# Patient Record
Sex: MALE | Race: WHITE | NOT HISPANIC OR LATINO | Employment: FULL TIME | URBAN - METROPOLITAN AREA
[De-identification: names, ages, dates, MRNs, and addresses within clinical notes are randomized per-mention and may not be internally consistent; named-entity substitution may affect disease eponyms.]

---

## 2022-07-06 ENCOUNTER — PREP FOR SURGERY (OUTPATIENT)
Dept: OTHER | Facility: HOSPITAL | Age: 47
End: 2022-07-06

## 2022-07-06 DIAGNOSIS — Z12.11 SCREEN FOR COLON CANCER: Primary | ICD-10-CM

## 2022-08-24 PROBLEM — Z12.11 SCREEN FOR COLON CANCER: Status: ACTIVE | Noted: 2022-08-24

## 2022-10-19 RX ORDER — OMEPRAZOLE 20 MG/1
20 CAPSULE, DELAYED RELEASE ORAL DAILY
COMMUNITY

## 2022-10-20 ENCOUNTER — ANESTHESIA (OUTPATIENT)
Dept: GASTROENTEROLOGY | Facility: HOSPITAL | Age: 47
End: 2022-10-20

## 2022-10-20 ENCOUNTER — ANESTHESIA EVENT (OUTPATIENT)
Dept: GASTROENTEROLOGY | Facility: HOSPITAL | Age: 47
End: 2022-10-20

## 2022-10-20 ENCOUNTER — HOSPITAL ENCOUNTER (OUTPATIENT)
Facility: HOSPITAL | Age: 47
Setting detail: HOSPITAL OUTPATIENT SURGERY
Discharge: HOME | End: 2022-10-20
Attending: SURGERY | Admitting: SURGERY

## 2022-10-20 VITALS
BODY MASS INDEX: 31.15 KG/M2 | HEART RATE: 63 BPM | HEIGHT: 72 IN | RESPIRATION RATE: 16 BRPM | DIASTOLIC BLOOD PRESSURE: 88 MMHG | WEIGHT: 230 LBS | OXYGEN SATURATION: 100 % | SYSTOLIC BLOOD PRESSURE: 144 MMHG

## 2022-10-20 DIAGNOSIS — Z12.11 SCREEN FOR COLON CANCER: ICD-10-CM

## 2022-10-20 PROCEDURE — 25010000002 PROPOFOL 10 MG/ML EMULSION: Performed by: ANESTHESIOLOGY

## 2022-10-20 PROCEDURE — 88305 TISSUE EXAM BY PATHOLOGIST: CPT | Performed by: SURGERY

## 2022-10-20 PROCEDURE — 45380 COLONOSCOPY AND BIOPSY: CPT | Performed by: SURGERY

## 2022-10-20 PROCEDURE — S0260 H&P FOR SURGERY: HCPCS | Performed by: SURGERY

## 2022-10-20 PROCEDURE — 45385 COLONOSCOPY W/LESION REMOVAL: CPT | Performed by: SURGERY

## 2022-10-20 RX ORDER — LIDOCAINE HYDROCHLORIDE 20 MG/ML
INJECTION, SOLUTION INFILTRATION; PERINEURAL AS NEEDED
Status: DISCONTINUED | OUTPATIENT
Start: 2022-10-20 | End: 2022-10-20 | Stop reason: SURG

## 2022-10-20 RX ORDER — SODIUM CHLORIDE 0.9 % (FLUSH) 0.9 %
10 SYRINGE (ML) INJECTION AS NEEDED
Status: DISCONTINUED | OUTPATIENT
Start: 2022-10-20 | End: 2022-10-20 | Stop reason: HOSPADM

## 2022-10-20 RX ORDER — LIDOCAINE HYDROCHLORIDE 10 MG/ML
0.5 INJECTION, SOLUTION INFILTRATION; PERINEURAL ONCE AS NEEDED
Status: DISCONTINUED | OUTPATIENT
Start: 2022-10-20 | End: 2022-10-20 | Stop reason: HOSPADM

## 2022-10-20 RX ORDER — PROPOFOL 10 MG/ML
VIAL (ML) INTRAVENOUS CONTINUOUS PRN
Status: DISCONTINUED | OUTPATIENT
Start: 2022-10-20 | End: 2022-10-20 | Stop reason: SURG

## 2022-10-20 RX ORDER — SODIUM CHLORIDE, SODIUM LACTATE, POTASSIUM CHLORIDE, CALCIUM CHLORIDE 600; 310; 30; 20 MG/100ML; MG/100ML; MG/100ML; MG/100ML
1000 INJECTION, SOLUTION INTRAVENOUS CONTINUOUS
Status: DISCONTINUED | OUTPATIENT
Start: 2022-10-20 | End: 2022-10-20 | Stop reason: HOSPADM

## 2022-10-20 RX ORDER — PROPOFOL 10 MG/ML
VIAL (ML) INTRAVENOUS AS NEEDED
Status: DISCONTINUED | OUTPATIENT
Start: 2022-10-20 | End: 2022-10-20 | Stop reason: SURG

## 2022-10-20 RX ADMIN — LIDOCAINE HYDROCHLORIDE 40 MG: 20 INJECTION, SOLUTION INFILTRATION; PERINEURAL at 13:03

## 2022-10-20 RX ADMIN — PROPOFOL 120 MG: 10 INJECTION, EMULSION INTRAVENOUS at 13:03

## 2022-10-20 RX ADMIN — SODIUM CHLORIDE, POTASSIUM CHLORIDE, SODIUM LACTATE AND CALCIUM CHLORIDE 1000 ML: 600; 310; 30; 20 INJECTION, SOLUTION INTRAVENOUS at 12:41

## 2022-10-20 RX ADMIN — Medication 140 MCG/KG/MIN: at 13:06

## 2022-10-20 RX ADMIN — PROPOFOL 20 MG: 10 INJECTION, EMULSION INTRAVENOUS at 13:04

## 2022-10-20 NOTE — OP NOTE
PREOPERATIVE DIAGNOSIS:  • Screening    POSTOPERATIVE DIAGNOSIS AND FINDINGS:  • Tiny transverse colon polyp  • Small descending colon polyp    PROCEDURE:  Colonoscopy to terminal ileum with cold biopsy removal of transverse colon polyp and snare removal of descending colon polyp    SURGEON:  Phillip Walker MD    ANESTHESIA:  MAC    SPECIMEN(S):  • Polyps    DESCRIPTION:  In decubitus position digital rectal exam was normal. Colonoscope inserted under direct visualization of lumen to cecum confirmed by visualization of ileocecal valve and appendiceal orifice.  Ileocecal valve was intubated and terminal ileum was grossly normal.  Scope was slowly withdrawn circumferentially examining all mucosal surfaces.  Bowel preparation was good.  There was a tiny polyp in the transverse colon removed completely with cold biopsy forceps, good hemostasis at the site.  A small descending colon polyp was then identified and removed with a cold snare and retrieved, good hemostasis at the site.  No other mucosal abnormalities were noted.  Tolerated well.    RECOMMENDATION FOR FUTURE SURVEILLANCE:  To be determined based on polyp pathology and issued as separate report    Phillip Walker M.D.

## 2022-10-20 NOTE — DISCHARGE INSTRUCTIONS
For the next 24 hours patient needs to be with a responsible adult.    For 24 hours DO NOT drive, operate machinery, appliances, drink alcohol, make important decisions or sign legal documents.    Start with a light or bland diet if you are feeling sick to your stomach otherwise advance to regular diet as tolerated.    Follow recommendations on procedure report if provided by your doctor.    Call Dr Walker for problems [phone]    Problems may include but not limited to: large amounts of bleeding, trouble breathing, repeated vomiting, severe unrelieved pain, fever or chills.

## 2022-10-20 NOTE — ANESTHESIA PREPROCEDURE EVALUATION
Anesthesia Evaluation     no history of anesthetic complications:  NPO Solid Status: > 8 hours  NPO Liquid Status: > 2 hours           Airway   Mallampati: II  Dental      Pulmonary - normal exam   (+) sleep apnea,   Cardiovascular - normal exam        Neuro/Psych  GI/Hepatic/Renal/Endo    (+) obesity,  GERD,      Musculoskeletal     Abdominal    Substance History      OB/GYN          Other                      Anesthesia Plan    ASA 2     MAC             CODE STATUS:

## 2022-10-20 NOTE — H&P
CC: Screening    HPI: 47-year-old gentleman presents for initial screening colonoscopy, no family history of colon cancer    PMH, PSH, MEDS AND ALLERGIES reviewed and reconciled in  EPIC    PHYSICAL EXAM:  • Constitutional:  awake, alert, no acute distress  • VS: afebrile, VSS  • Respiratory:  normal inspiratory effort  • Cardiovascular: regular rate  • Gastrointestinal: Soft    ROS:  relevant systems negative other than any presenting complaints    ASSESSMENT/PLAN:    47-year-old gentleman presents for initial screening colonoscopy    Phillip Walker M.D.

## 2022-10-20 NOTE — ANESTHESIA POSTPROCEDURE EVALUATION
"Patient: Marcos Odell II    Procedure Summary     Date: 10/20/22 [facility] / [facility]    Anesthesia Start: 1302 Anesthesia Stop: 1321    Procedure: COLONOSCOPY TO CECUM WITH COLD POLYPECTOMIES Diagnosis:       Screen for colon cancer      (Screen for colon cancer [Z12.11])    Surgeons: Phillip Walker MD Provider: Florencio Glass MD    Anesthesia Type: MAC ASA Status: 2          Anesthesia Type: MAC    Vitals  Vitals Value Taken Time   /86 10/20/22 1329   Temp     Pulse 67 10/20/22 1329   Resp 16 10/20/22 1329   SpO2 98 % 10/20/22 1329           Post Anesthesia Care and Evaluation    Level of consciousness: awake and alert  Pain management: adequate  Anesthetic complications: No anesthetic complications    Cardiovascular status: acceptable  Respiratory status: acceptable  Hydration status: acceptable    Comments: /86 (BP Location: Left arm, Patient Position: Lying)   Pulse 67   Resp 16   Ht 182.9 cm (72\")   Wt 104 kg (230 lb)   SpO2 98%   BMI 31.19 kg/m²         "

## 2022-10-21 ENCOUNTER — DOCUMENTATION (OUTPATIENT)
Dept: SURGERY | Facility: CLINIC | Age: 47
End: 2022-10-21

## 2022-10-21 LAB
LAB AP CASE REPORT: NORMAL
PATH REPORT.FINAL DX SPEC: NORMAL
PATH REPORT.GROSS SPEC: NORMAL

## 2022-10-21 NOTE — PROGRESS NOTES
ENDOSCOPY FOLLOW UP NOTE    Colonoscopy 10/20/2022    Indication:  • Screening    Findings:  • Tiny transverse colon polyp: Pathology-tubular adenoma  • Small descending colon polyp: Pathology-tubular adenoma    Recommendations:  • 5-year surveillance    Phillip Walker M.D.

## 2022-10-24 ENCOUNTER — TELEPHONE (OUTPATIENT)
Dept: SURGERY | Facility: CLINIC | Age: 47
End: 2022-10-24

## 2022-10-24 NOTE — TELEPHONE ENCOUNTER
----- Message from Phillip Walker MD sent at 10/21/2022 12:43 PM EDT -----  Please let him know that he had 2 benign polyps and 5-year surveillance recommended-put in computer for reminder

## 2022-11-23 ENCOUNTER — HOSPITAL ENCOUNTER (INPATIENT)
Facility: HOSPITAL | Age: 47
LOS: 2 days | Discharge: HOME | End: 2022-11-25
Attending: EMERGENCY MEDICINE | Admitting: INTERNAL MEDICINE

## 2022-11-23 DIAGNOSIS — Z12.11 SCREEN FOR COLON CANCER: ICD-10-CM

## 2022-11-23 DIAGNOSIS — I21.19 ACUTE ST ELEVATION MYOCARDIAL INFARCTION (STEMI) OF INFERIOR WALL: Primary | ICD-10-CM

## 2022-11-23 LAB
ALBUMIN SERPL-MCNC: 4.2 G/DL (ref 3.5–5.2)
ALBUMIN/GLOB SERPL: 1.6 G/DL
ALP SERPL-CCNC: 78 U/L (ref 39–117)
ALT SERPL W P-5'-P-CCNC: 23 U/L (ref 1–41)
ANION GAP SERPL CALCULATED.3IONS-SCNC: 18.4 MMOL/L (ref 5–15)
AST SERPL-CCNC: 19 U/L (ref 1–40)
BASOPHILS # BLD AUTO: 0.1 10*3/MM3 (ref 0–0.2)
BASOPHILS NFR BLD AUTO: 0.4 % (ref 0–1.5)
BILIRUB SERPL-MCNC: <0.2 MG/DL (ref 0–1.2)
BUN SERPL-MCNC: 5 MG/DL (ref 6–20)
BUN/CREAT SERPL: 6.3 (ref 7–25)
CALCIUM SPEC-SCNC: 8.8 MG/DL (ref 8.6–10.5)
CHLORIDE SERPL-SCNC: 102 MMOL/L (ref 98–107)
CO2 SERPL-SCNC: 18.6 MMOL/L (ref 22–29)
CREAT SERPL-MCNC: 0.8 MG/DL (ref 0.76–1.27)
DEPRECATED RDW RBC AUTO: 41 FL (ref 37–54)
EGFRCR SERPLBLD CKD-EPI 2021: 110.5 ML/MIN/1.73
EOSINOPHIL # BLD AUTO: 0.28 10*3/MM3 (ref 0–0.4)
EOSINOPHIL NFR BLD AUTO: 1.2 % (ref 0.3–6.2)
ERYTHROCYTE [DISTWIDTH] IN BLOOD BY AUTOMATED COUNT: 12.6 % (ref 12.3–15.4)
GLOBULIN UR ELPH-MCNC: 2.7 GM/DL
GLUCOSE BLDC GLUCOMTR-MCNC: 238 MG/DL (ref 70–130)
GLUCOSE SERPL-MCNC: 260 MG/DL (ref 65–99)
HCT VFR BLD AUTO: 45.7 % (ref 37.5–51)
HGB BLD-MCNC: 15.6 G/DL (ref 13–17.7)
IMM GRANULOCYTES # BLD AUTO: 0.19 10*3/MM3 (ref 0–0.05)
IMM GRANULOCYTES NFR BLD AUTO: 0.8 % (ref 0–0.5)
LIPASE SERPL-CCNC: 19 U/L (ref 13–60)
LYMPHOCYTES # BLD AUTO: 4.53 10*3/MM3 (ref 0.7–3.1)
LYMPHOCYTES NFR BLD AUTO: 20.2 % (ref 19.6–45.3)
MCH RBC QN AUTO: 30.4 PG (ref 26.6–33)
MCHC RBC AUTO-ENTMCNC: 34.1 G/DL (ref 31.5–35.7)
MCV RBC AUTO: 88.9 FL (ref 79–97)
MONOCYTES # BLD AUTO: 1.68 10*3/MM3 (ref 0.1–0.9)
MONOCYTES NFR BLD AUTO: 7.5 % (ref 5–12)
NEUTROPHILS NFR BLD AUTO: 15.65 10*3/MM3 (ref 1.7–7)
NEUTROPHILS NFR BLD AUTO: 69.9 % (ref 42.7–76)
NRBC BLD AUTO-RTO: 0 /100 WBC (ref 0–0.2)
NT-PROBNP SERPL-MCNC: 12.2 PG/ML (ref 0–450)
PLATELET # BLD AUTO: 377 10*3/MM3 (ref 140–450)
PMV BLD AUTO: 9.5 FL (ref 6–12)
POTASSIUM SERPL-SCNC: 2.8 MMOL/L (ref 3.5–5.2)
PROT SERPL-MCNC: 6.9 G/DL (ref 6–8.5)
RBC # BLD AUTO: 5.14 10*6/MM3 (ref 4.14–5.8)
SODIUM SERPL-SCNC: 139 MMOL/L (ref 136–145)
TROPONIN T SERPL-MCNC: 2.81 NG/ML (ref 0–0.03)
TROPONIN T SERPL-MCNC: <0.01 NG/ML (ref 0–0.03)
WBC NRBC COR # BLD: 22.43 10*3/MM3 (ref 3.4–10.8)

## 2022-11-23 PROCEDURE — C1887 CATHETER, GUIDING: HCPCS | Performed by: INTERNAL MEDICINE

## 2022-11-23 PROCEDURE — C1769 GUIDE WIRE: HCPCS | Performed by: INTERNAL MEDICINE

## 2022-11-23 PROCEDURE — C1874 STENT, COATED/COV W/DEL SYS: HCPCS | Performed by: INTERNAL MEDICINE

## 2022-11-23 PROCEDURE — 25010000002 MIDAZOLAM PER 1 MG: Performed by: INTERNAL MEDICINE

## 2022-11-23 PROCEDURE — 85025 COMPLETE CBC W/AUTO DIFF WBC: CPT | Performed by: EMERGENCY MEDICINE

## 2022-11-23 PROCEDURE — 80053 COMPREHEN METABOLIC PANEL: CPT | Performed by: EMERGENCY MEDICINE

## 2022-11-23 PROCEDURE — 0 IOPAMIDOL PER 1 ML: Performed by: INTERNAL MEDICINE

## 2022-11-23 PROCEDURE — 93005 ELECTROCARDIOGRAM TRACING: CPT | Performed by: EMERGENCY MEDICINE

## 2022-11-23 PROCEDURE — 93458 L HRT ARTERY/VENTRICLE ANGIO: CPT | Performed by: INTERNAL MEDICINE

## 2022-11-23 PROCEDURE — 99285 EMERGENCY DEPT VISIT HI MDM: CPT

## 2022-11-23 PROCEDURE — 4A023N7 MEASUREMENT OF CARDIAC SAMPLING AND PRESSURE, LEFT HEART, PERCUTANEOUS APPROACH: ICD-10-PCS | Performed by: INTERNAL MEDICINE

## 2022-11-23 PROCEDURE — 92941 PRQ TRLML REVSC TOT OCCL AMI: CPT | Performed by: INTERNAL MEDICINE

## 2022-11-23 PROCEDURE — 84484 ASSAY OF TROPONIN QUANT: CPT | Performed by: EMERGENCY MEDICINE

## 2022-11-23 PROCEDURE — 25010000002 HEPARIN (PORCINE) PER 1000 UNITS: Performed by: INTERNAL MEDICINE

## 2022-11-23 PROCEDURE — 85347 COAGULATION TIME ACTIVATED: CPT

## 2022-11-23 PROCEDURE — 83690 ASSAY OF LIPASE: CPT | Performed by: EMERGENCY MEDICINE

## 2022-11-23 PROCEDURE — 99223 1ST HOSP IP/OBS HIGH 75: CPT | Performed by: INTERNAL MEDICINE

## 2022-11-23 PROCEDURE — C1725 CATH, TRANSLUMIN NON-LASER: HCPCS | Performed by: INTERNAL MEDICINE

## 2022-11-23 PROCEDURE — 83880 ASSAY OF NATRIURETIC PEPTIDE: CPT | Performed by: EMERGENCY MEDICINE

## 2022-11-23 PROCEDURE — B2111ZZ FLUOROSCOPY OF MULTIPLE CORONARY ARTERIES USING LOW OSMOLAR CONTRAST: ICD-10-PCS | Performed by: INTERNAL MEDICINE

## 2022-11-23 PROCEDURE — C1894 INTRO/SHEATH, NON-LASER: HCPCS | Performed by: INTERNAL MEDICINE

## 2022-11-23 PROCEDURE — 93005 ELECTROCARDIOGRAM TRACING: CPT | Performed by: INTERNAL MEDICINE

## 2022-11-23 PROCEDURE — B2151ZZ FLUOROSCOPY OF LEFT HEART USING LOW OSMOLAR CONTRAST: ICD-10-PCS | Performed by: INTERNAL MEDICINE

## 2022-11-23 PROCEDURE — 027034Z DILATION OF CORONARY ARTERY, ONE ARTERY WITH DRUG-ELUTING INTRALUMINAL DEVICE, PERCUTANEOUS APPROACH: ICD-10-PCS | Performed by: INTERNAL MEDICINE

## 2022-11-23 PROCEDURE — C9606 PERC D-E COR REVASC W AMI S: HCPCS | Performed by: INTERNAL MEDICINE

## 2022-11-23 PROCEDURE — 82962 GLUCOSE BLOOD TEST: CPT

## 2022-11-23 PROCEDURE — 99152 MOD SED SAME PHYS/QHP 5/>YRS: CPT | Performed by: INTERNAL MEDICINE

## 2022-11-23 DEVICE — XIENCE SKYPOINT™ EVEROLIMUS ELUTING CORONARY STENT SYSTEM 3.50 MM X 23 MM / RAPID-EXCHANGE
Type: IMPLANTABLE DEVICE | Site: CORONARY | Status: FUNCTIONAL
Brand: XIENCE SKYPOINT™

## 2022-11-23 RX ORDER — HEPARIN SODIUM 1000 [USP'U]/ML
INJECTION, SOLUTION INTRAVENOUS; SUBCUTANEOUS
Status: DISCONTINUED | OUTPATIENT
Start: 2022-11-23 | End: 2022-11-23 | Stop reason: HOSPADM

## 2022-11-23 RX ORDER — ACETAMINOPHEN 325 MG/1
650 TABLET ORAL EVERY 4 HOURS PRN
Status: DISCONTINUED | OUTPATIENT
Start: 2022-11-23 | End: 2022-11-25 | Stop reason: HOSPADM

## 2022-11-23 RX ORDER — ATORVASTATIN CALCIUM 20 MG/1
40 TABLET, FILM COATED ORAL NIGHTLY
Status: DISCONTINUED | OUTPATIENT
Start: 2022-11-23 | End: 2022-11-25 | Stop reason: HOSPADM

## 2022-11-23 RX ORDER — VERAPAMIL HYDROCHLORIDE 2.5 MG/ML
INJECTION, SOLUTION INTRAVENOUS
Status: DISCONTINUED | OUTPATIENT
Start: 2022-11-23 | End: 2022-11-23 | Stop reason: HOSPADM

## 2022-11-23 RX ORDER — SODIUM CHLORIDE 0.9 % (FLUSH) 0.9 %
10 SYRINGE (ML) INJECTION AS NEEDED
Status: DISCONTINUED | OUTPATIENT
Start: 2022-11-23 | End: 2022-11-25 | Stop reason: HOSPADM

## 2022-11-23 RX ORDER — METOPROLOL TARTRATE 5 MG/5ML
INJECTION INTRAVENOUS
Status: DISCONTINUED | OUTPATIENT
Start: 2022-11-23 | End: 2022-11-23 | Stop reason: HOSPADM

## 2022-11-23 RX ORDER — NALOXONE HCL 0.4 MG/ML
0.4 VIAL (ML) INJECTION
Status: DISCONTINUED | OUTPATIENT
Start: 2022-11-23 | End: 2022-11-25 | Stop reason: HOSPADM

## 2022-11-23 RX ORDER — PRASUGREL 10 MG/1
TABLET, FILM COATED ORAL
Status: DISCONTINUED | OUTPATIENT
Start: 2022-11-23 | End: 2022-11-23 | Stop reason: HOSPADM

## 2022-11-23 RX ORDER — MORPHINE SULFATE 2 MG/ML
2 INJECTION, SOLUTION INTRAMUSCULAR; INTRAVENOUS
Status: DISCONTINUED | OUTPATIENT
Start: 2022-11-23 | End: 2022-11-25 | Stop reason: HOSPADM

## 2022-11-23 RX ORDER — LIDOCAINE HYDROCHLORIDE 20 MG/ML
INJECTION, SOLUTION INFILTRATION; PERINEURAL
Status: COMPLETED | OUTPATIENT
Start: 2022-11-23 | End: 2022-11-23

## 2022-11-23 RX ORDER — HYDROCODONE BITARTRATE AND ACETAMINOPHEN 5; 325 MG/1; MG/1
1 TABLET ORAL EVERY 4 HOURS PRN
Status: DISCONTINUED | OUTPATIENT
Start: 2022-11-23 | End: 2022-11-25 | Stop reason: HOSPADM

## 2022-11-23 RX ORDER — ONDANSETRON 2 MG/ML
4 INJECTION INTRAMUSCULAR; INTRAVENOUS EVERY 6 HOURS PRN
Status: DISCONTINUED | OUTPATIENT
Start: 2022-11-23 | End: 2022-11-25 | Stop reason: HOSPADM

## 2022-11-23 RX ORDER — ONDANSETRON 4 MG/1
4 TABLET, FILM COATED ORAL EVERY 6 HOURS PRN
Status: DISCONTINUED | OUTPATIENT
Start: 2022-11-23 | End: 2022-11-25 | Stop reason: HOSPADM

## 2022-11-23 RX ORDER — SODIUM CHLORIDE 9 MG/ML
50 INJECTION, SOLUTION INTRAVENOUS CONTINUOUS
Status: ACTIVE | OUTPATIENT
Start: 2022-11-23 | End: 2022-11-24

## 2022-11-23 RX ORDER — MIDAZOLAM HYDROCHLORIDE 1 MG/ML
INJECTION INTRAMUSCULAR; INTRAVENOUS
Status: COMPLETED | OUTPATIENT
Start: 2022-11-23 | End: 2022-11-23

## 2022-11-23 RX ORDER — TEMAZEPAM 15 MG/1
15 CAPSULE ORAL NIGHTLY PRN
Status: DISCONTINUED | OUTPATIENT
Start: 2022-11-23 | End: 2022-11-25 | Stop reason: HOSPADM

## 2022-11-23 RX ORDER — PRASUGREL 10 MG/1
10 TABLET, FILM COATED ORAL DAILY
Status: DISCONTINUED | OUTPATIENT
Start: 2022-11-24 | End: 2022-11-25 | Stop reason: HOSPADM

## 2022-11-23 RX ORDER — ASPIRIN 81 MG/1
81 TABLET ORAL DAILY
Status: DISCONTINUED | OUTPATIENT
Start: 2022-11-23 | End: 2022-11-25 | Stop reason: HOSPADM

## 2022-11-23 RX ADMIN — SODIUM CHLORIDE 50 ML/HR: 9 INJECTION, SOLUTION INTRAVENOUS at 21:06

## 2022-11-23 RX ADMIN — ATORVASTATIN CALCIUM 40 MG: 20 TABLET, FILM COATED ORAL at 20:58

## 2022-11-23 RX ADMIN — ASPIRIN 81 MG: 81 TABLET, COATED ORAL at 20:58

## 2022-11-23 RX ADMIN — HYDROCODONE BITARTRATE AND ACETAMINOPHEN 1 TABLET: 5; 325 TABLET ORAL at 23:06

## 2022-11-24 ENCOUNTER — APPOINTMENT (OUTPATIENT)
Dept: CARDIOLOGY | Facility: HOSPITAL | Age: 47
End: 2022-11-24

## 2022-11-24 LAB
ANION GAP SERPL CALCULATED.3IONS-SCNC: 9.6 MMOL/L (ref 5–15)
AORTIC DIMENSIONLESS INDEX: 0.9 (DI)
BH CV ECHO MEAS - AO MAX PG: 5 MMHG
BH CV ECHO MEAS - AO MEAN PG: 2.5 MMHG
BH CV ECHO MEAS - AO V2 MAX: 111.5 CM/SEC
BH CV ECHO MEAS - AO V2 VTI: 19.6 CM
BH CV ECHO MEAS - AVA(I,D): 3.3 CM2
BH CV ECHO MEAS - EDV(MOD-SP2): 124 ML
BH CV ECHO MEAS - EDV(MOD-SP4): 109 ML
BH CV ECHO MEAS - EF(MOD-BP): 51.5 %
BH CV ECHO MEAS - EF(MOD-SP2): 55.6 %
BH CV ECHO MEAS - EF(MOD-SP4): 47.7 %
BH CV ECHO MEAS - ESV(MOD-SP2): 55 ML
BH CV ECHO MEAS - ESV(MOD-SP4): 57 ML
BH CV ECHO MEAS - LAT PEAK E' VEL: 11.3 CM/SEC
BH CV ECHO MEAS - LV DIASTOLIC VOL/BSA (35-75): 47.6 CM2
BH CV ECHO MEAS - LV MAX PG: 2.8 MMHG
BH CV ECHO MEAS - LV MEAN PG: 1.16 MMHG
BH CV ECHO MEAS - LV SYSTOLIC VOL/BSA (12-30): 24.9 CM2
BH CV ECHO MEAS - LV V1 MAX: 84.4 CM/SEC
BH CV ECHO MEAS - LV V1 VTI: 17.2 CM
BH CV ECHO MEAS - LVOT AREA: 3.8 CM2
BH CV ECHO MEAS - LVOT DIAM: 2.19 CM
BH CV ECHO MEAS - MED PEAK E' VEL: 6.3 CM/SEC
BH CV ECHO MEAS - MV A DUR: 0.13 SEC
BH CV ECHO MEAS - MV A MAX VEL: 94.3 CM/SEC
BH CV ECHO MEAS - MV DEC SLOPE: 415.2 CM/SEC2
BH CV ECHO MEAS - MV DEC TIME: 200 MSEC
BH CV ECHO MEAS - MV E MAX VEL: 88.3 CM/SEC
BH CV ECHO MEAS - MV E/A: 0.94
BH CV ECHO MEAS - MV MAX PG: 3.1 MMHG
BH CV ECHO MEAS - MV MEAN PG: 1.46 MMHG
BH CV ECHO MEAS - MV P1/2T: 60.3 MSEC
BH CV ECHO MEAS - MV V2 VTI: 25.3 CM
BH CV ECHO MEAS - MVA(P1/2T): 3.6 CM2
BH CV ECHO MEAS - MVA(VTI): 2.6 CM2
BH CV ECHO MEAS - PA ACC TIME: 0.1 SEC
BH CV ECHO MEAS - PA PR(ACCEL): 35 MMHG
BH CV ECHO MEAS - PA V2 MAX: 103.2 CM/SEC
BH CV ECHO MEAS - PULM A REVS DUR: 0.12 SEC
BH CV ECHO MEAS - PULM A REVS VEL: 38.1 CM/SEC
BH CV ECHO MEAS - PULM DIAS VEL: 35 CM/SEC
BH CV ECHO MEAS - PULM S/D: 0.9
BH CV ECHO MEAS - PULM SYS VEL: 31.5 CM/SEC
BH CV ECHO MEAS - RAP SYSTOLE: 3 MMHG
BH CV ECHO MEAS - RV MAX PG: 1.61 MMHG
BH CV ECHO MEAS - RV V1 MAX: 63.4 CM/SEC
BH CV ECHO MEAS - RV V1 VTI: 12.9 CM
BH CV ECHO MEAS - SI(MOD-SP2): 30.1 ML/M2
BH CV ECHO MEAS - SI(MOD-SP4): 22.7 ML/M2
BH CV ECHO MEAS - SV(LVOT): 64.7 ML
BH CV ECHO MEAS - SV(MOD-SP2): 69 ML
BH CV ECHO MEAS - SV(MOD-SP4): 52 ML
BH CV ECHO MEAS - TAPSE (>1.6): 1.82 CM
BH CV ECHO MEASUREMENTS AVERAGE E/E' RATIO: 10.03
BH CV XLRA - RV BASE: 3.6 CM
BH CV XLRA - RV LENGTH: 7.7 CM
BH CV XLRA - RV MID: 3.1 CM
BH CV XLRA - TDI S': 10 CM/SEC
BUN SERPL-MCNC: 5 MG/DL (ref 6–20)
BUN/CREAT SERPL: 6.6 (ref 7–25)
CALCIUM SPEC-SCNC: 9.2 MG/DL (ref 8.6–10.5)
CHLORIDE SERPL-SCNC: 104 MMOL/L (ref 98–107)
CHOLEST SERPL-MCNC: 198 MG/DL (ref 0–200)
CO2 SERPL-SCNC: 23.4 MMOL/L (ref 22–29)
CREAT SERPL-MCNC: 0.76 MG/DL (ref 0.76–1.27)
DEPRECATED RDW RBC AUTO: 38.7 FL (ref 37–54)
EGFRCR SERPLBLD CKD-EPI 2021: 112.3 ML/MIN/1.73
ERYTHROCYTE [DISTWIDTH] IN BLOOD BY AUTOMATED COUNT: 12.4 % (ref 12.3–15.4)
GLUCOSE BLDC GLUCOMTR-MCNC: 123 MG/DL (ref 70–130)
GLUCOSE BLDC GLUCOMTR-MCNC: 143 MG/DL (ref 70–130)
GLUCOSE BLDC GLUCOMTR-MCNC: 152 MG/DL (ref 70–130)
GLUCOSE BLDC GLUCOMTR-MCNC: 156 MG/DL (ref 70–130)
GLUCOSE BLDC GLUCOMTR-MCNC: 187 MG/DL (ref 70–130)
GLUCOSE SERPL-MCNC: 174 MG/DL (ref 65–99)
HBA1C MFR BLD: 6.7 % (ref 4.8–5.6)
HCT VFR BLD AUTO: 41.6 % (ref 37.5–51)
HDLC SERPL-MCNC: 32 MG/DL (ref 40–60)
HGB BLD-MCNC: 14.9 G/DL (ref 13–17.7)
LDLC SERPL CALC-MCNC: 105 MG/DL (ref 0–100)
LDLC/HDLC SERPL: 2.94 {RATIO}
LEFT ATRIUM VOLUME INDEX: 17.9 ML/M2
MAXIMAL PREDICTED HEART RATE: 174 BPM
MCH RBC QN AUTO: 30.5 PG (ref 26.6–33)
MCHC RBC AUTO-ENTMCNC: 35.8 G/DL (ref 31.5–35.7)
MCV RBC AUTO: 85.2 FL (ref 79–97)
PLATELET # BLD AUTO: 310 10*3/MM3 (ref 140–450)
PMV BLD AUTO: 9.3 FL (ref 6–12)
POTASSIUM SERPL-SCNC: 4 MMOL/L (ref 3.5–5.2)
QT INTERVAL: 384 MS
QT INTERVAL: 384 MS
QT INTERVAL: 484 MS
RBC # BLD AUTO: 4.88 10*6/MM3 (ref 4.14–5.8)
SINUS: 3.2 CM
SODIUM SERPL-SCNC: 137 MMOL/L (ref 136–145)
STRESS TARGET HR: 148 BPM
TRIGL SERPL-MCNC: 359 MG/DL (ref 0–150)
VLDLC SERPL-MCNC: 61 MG/DL (ref 5–40)
WBC NRBC COR # BLD: 13.93 10*3/MM3 (ref 3.4–10.8)

## 2022-11-24 PROCEDURE — 63710000001 INSULIN LISPRO (HUMAN) PER 5 UNITS: Performed by: INTERNAL MEDICINE

## 2022-11-24 PROCEDURE — 93005 ELECTROCARDIOGRAM TRACING: CPT | Performed by: INTERNAL MEDICINE

## 2022-11-24 PROCEDURE — 80061 LIPID PANEL: CPT | Performed by: INTERNAL MEDICINE

## 2022-11-24 PROCEDURE — 80048 BASIC METABOLIC PNL TOTAL CA: CPT | Performed by: INTERNAL MEDICINE

## 2022-11-24 PROCEDURE — 93306 TTE W/DOPPLER COMPLETE: CPT

## 2022-11-24 PROCEDURE — 83036 HEMOGLOBIN GLYCOSYLATED A1C: CPT | Performed by: INTERNAL MEDICINE

## 2022-11-24 PROCEDURE — 93356 MYOCRD STRAIN IMG SPCKL TRCK: CPT

## 2022-11-24 PROCEDURE — 82962 GLUCOSE BLOOD TEST: CPT

## 2022-11-24 PROCEDURE — 93306 TTE W/DOPPLER COMPLETE: CPT | Performed by: INTERNAL MEDICINE

## 2022-11-24 PROCEDURE — 99232 SBSQ HOSP IP/OBS MODERATE 35: CPT | Performed by: INTERNAL MEDICINE

## 2022-11-24 PROCEDURE — 85027 COMPLETE CBC AUTOMATED: CPT | Performed by: INTERNAL MEDICINE

## 2022-11-24 RX ORDER — LOSARTAN POTASSIUM 25 MG/1
25 TABLET ORAL
Status: DISCONTINUED | OUTPATIENT
Start: 2022-11-24 | End: 2022-11-25 | Stop reason: HOSPADM

## 2022-11-24 RX ORDER — DEXTROSE MONOHYDRATE 25 G/50ML
25 INJECTION, SOLUTION INTRAVENOUS
Status: DISCONTINUED | OUTPATIENT
Start: 2022-11-24 | End: 2022-11-25 | Stop reason: HOSPADM

## 2022-11-24 RX ORDER — INSULIN LISPRO 100 [IU]/ML
0-14 INJECTION, SOLUTION INTRAVENOUS; SUBCUTANEOUS
Status: DISCONTINUED | OUTPATIENT
Start: 2022-11-24 | End: 2022-11-25 | Stop reason: HOSPADM

## 2022-11-24 RX ORDER — NICOTINE POLACRILEX 4 MG
15 LOZENGE BUCCAL
Status: DISCONTINUED | OUTPATIENT
Start: 2022-11-24 | End: 2022-11-25 | Stop reason: HOSPADM

## 2022-11-24 RX ADMIN — HYDROCODONE BITARTRATE AND ACETAMINOPHEN 1 TABLET: 5; 325 TABLET ORAL at 13:15

## 2022-11-24 RX ADMIN — HYDROCODONE BITARTRATE AND ACETAMINOPHEN 1 TABLET: 5; 325 TABLET ORAL at 17:49

## 2022-11-24 RX ADMIN — INSULIN LISPRO 2 UNITS: 100 INJECTION, SOLUTION INTRAVENOUS; SUBCUTANEOUS at 16:57

## 2022-11-24 RX ADMIN — HYDROCODONE BITARTRATE AND ACETAMINOPHEN 1 TABLET: 5; 325 TABLET ORAL at 03:15

## 2022-11-24 RX ADMIN — ASPIRIN 81 MG: 81 TABLET, COATED ORAL at 08:40

## 2022-11-24 RX ADMIN — METOPROLOL TARTRATE 25 MG: 25 TABLET, FILM COATED ORAL at 08:40

## 2022-11-24 RX ADMIN — ATORVASTATIN CALCIUM 40 MG: 20 TABLET, FILM COATED ORAL at 20:01

## 2022-11-24 RX ADMIN — HYDROCODONE BITARTRATE AND ACETAMINOPHEN 1 TABLET: 5; 325 TABLET ORAL at 21:55

## 2022-11-24 RX ADMIN — LOSARTAN POTASSIUM 25 MG: 25 TABLET, FILM COATED ORAL at 15:51

## 2022-11-24 RX ADMIN — HYDROCODONE BITARTRATE AND ACETAMINOPHEN 1 TABLET: 5; 325 TABLET ORAL at 08:47

## 2022-11-24 RX ADMIN — PRASUGREL 10 MG: 10 TABLET, FILM COATED ORAL at 08:40

## 2022-11-24 RX ADMIN — METOPROLOL TARTRATE 25 MG: 25 TABLET, FILM COATED ORAL at 20:01

## 2022-11-25 ENCOUNTER — READMISSION MANAGEMENT (OUTPATIENT)
Dept: CALL CENTER | Facility: HOSPITAL | Age: 47
End: 2022-11-25

## 2022-11-25 VITALS
RESPIRATION RATE: 18 BRPM | OXYGEN SATURATION: 98 % | HEIGHT: 72 IN | DIASTOLIC BLOOD PRESSURE: 75 MMHG | BODY MASS INDEX: 32.1 KG/M2 | HEART RATE: 81 BPM | WEIGHT: 237 LBS | TEMPERATURE: 98.2 F | SYSTOLIC BLOOD PRESSURE: 106 MMHG

## 2022-11-25 LAB
ACT BLD: 295 SECONDS (ref 82–152)
GLUCOSE BLDC GLUCOMTR-MCNC: 152 MG/DL (ref 70–130)

## 2022-11-25 PROCEDURE — 82962 GLUCOSE BLOOD TEST: CPT

## 2022-11-25 PROCEDURE — 63710000001 INSULIN LISPRO (HUMAN) PER 5 UNITS: Performed by: INTERNAL MEDICINE

## 2022-11-25 PROCEDURE — 99238 HOSP IP/OBS DSCHRG MGMT 30/<: CPT | Performed by: NURSE PRACTITIONER

## 2022-11-25 RX ORDER — ATORVASTATIN CALCIUM 40 MG/1
40 TABLET, FILM COATED ORAL NIGHTLY
Qty: 30 TABLET | Refills: 5 | Status: SHIPPED | OUTPATIENT
Start: 2022-11-25

## 2022-11-25 RX ORDER — ASPIRIN 81 MG/1
81 TABLET ORAL DAILY
Qty: 30 TABLET | Refills: 11 | Status: SHIPPED | OUTPATIENT
Start: 2022-11-26

## 2022-11-25 RX ORDER — LOSARTAN POTASSIUM 25 MG/1
25 TABLET ORAL
Qty: 30 TABLET | Refills: 11 | Status: SHIPPED | OUTPATIENT
Start: 2022-11-26

## 2022-11-25 RX ORDER — METFORMIN HYDROCHLORIDE 500 MG/1
500 TABLET, EXTENDED RELEASE ORAL
Qty: 30 TABLET | Refills: 1 | Status: SHIPPED | OUTPATIENT
Start: 2022-11-25

## 2022-11-25 RX ORDER — PRASUGREL 10 MG/1
10 TABLET, FILM COATED ORAL DAILY
Qty: 30 TABLET | Refills: 11 | Status: SHIPPED | OUTPATIENT
Start: 2022-11-26

## 2022-11-25 RX ADMIN — INSULIN LISPRO 3 UNITS: 100 INJECTION, SOLUTION INTRAVENOUS; SUBCUTANEOUS at 07:04

## 2022-11-25 RX ADMIN — ASPIRIN 81 MG: 81 TABLET, COATED ORAL at 08:27

## 2022-11-25 RX ADMIN — METOPROLOL TARTRATE 25 MG: 25 TABLET, FILM COATED ORAL at 08:27

## 2022-11-25 RX ADMIN — LOSARTAN POTASSIUM 25 MG: 25 TABLET, FILM COATED ORAL at 08:27

## 2022-11-25 RX ADMIN — PRASUGREL 10 MG: 10 TABLET, FILM COATED ORAL at 08:27

## 2022-11-26 NOTE — OUTREACH NOTE
Prep Survey    Flowsheet Row Responses   [facility] patient discharged from? [place]   Is LACE score < 7 ? Yes   Emergency Room discharge w/ pulse ox? No   Eligibility Readm Mgmt   Discharge diagnosis   Acute ST elevation myocardial infarction   Does the patient have one of the following disease processes/diagnoses(primary or secondary)? Acute MI (STEMI,NSTEMI)   Does the patient have Home health ordered? No   Is there a DME ordered? No   Prep survey completed? Yes          PHU STINSON - Registered Nurse

## 2022-11-30 ENCOUNTER — TELEPHONE (OUTPATIENT)
Dept: CARDIAC REHAB | Facility: HOSPITAL | Age: 47
End: 2022-11-30

## 2022-11-30 NOTE — TELEPHONE ENCOUNTER
Called patient following discharge form hospital to discuss cardiac rehab program. Patient stated would think about attending, was going to meet with APRN next Tuesday and would discuss this with her and call us back in interested in attending. Provided our contact information.

## 2022-12-01 ENCOUNTER — READMISSION MANAGEMENT (OUTPATIENT)
Dept: CALL CENTER | Facility: HOSPITAL | Age: 47
End: 2022-12-01

## 2022-12-01 NOTE — OUTREACH NOTE
AMI Week 1 Survey    Flowsheet Row Responses   [facility] facility patient discharged from? [place]   Does the patient have one of the following disease processes/diagnoses(primary or secondary)? Acute MI (STEMI,NSTEMI)   Week 1 attempt successful? No   Unsuccessful attempts Attempt 1          FANTA MEDRANO - Registered Nurse

## 2022-12-05 ENCOUNTER — READMISSION MANAGEMENT (OUTPATIENT)
Dept: CALL CENTER | Facility: HOSPITAL | Age: 47
End: 2022-12-05

## 2022-12-05 NOTE — OUTREACH NOTE
AMI Week 1 Survey    Flowsheet Row Responses   [facility] patient discharged from? [place]   Does the patient have one of the following disease processes/diagnoses(primary or secondary)? Acute MI (STEMI,NSTEMI)   Week 1 attempt successful? Yes   Call start time 1652   Call end time 1655   Discharge diagnosis   Acute ST elevation myocardial infarction   Person spoke with today (if not patient) and relationship Gauri- wife   Meds reviewed with patient/caregiver? Yes   Is the patient having any side effects they believe may be caused by any medication additions or changes? No   Does the patient have all prescriptions related to this admission filled (includes statins,anticoagulants,HTN meds,anti-arrhythmia meds) Yes   Is the patient taking all medications as directed (includes completed medication regime)? Yes   Does the patient have a primary care provider?  Yes   Does the patient have an appointment with their PCP,cardiologist,or clinic within 7 days of discharge? Yes   Has the patient kept scheduled appointments due by today? N/A   Psychosocial issues? No   Comments Per wife pt has no issues, denies chest pain, SOA/nausea/diaphoresis. LHC site- right radial healed.   Did the patient receive a copy of their discharge instructions? Yes   Nursing interventions Reviewed instructions with patient   What is the patient's perception of their health status since discharge? Returned to baseline/stable   Nursing interventions Nurse provided patient education   Is the patient/caregiver able to teach back signs and symptoms of when to call for help immediately: Sudden chest discomfort, Sudden discomfort in arms, back, neck or jaw, Dizziness or lightheadedness   Nursing interventions Nurse provided patient education   Is the patient/caregiver able to teach back lifestyle changes to help prevent MIs Regular exercise as approved by provider   Is the patient/caregiver able to teach back ways to prevent a second heart attack:  Follow up with MD   Is the patient/caregiver able to teach back the hierarchy of who to call/visit for symptoms/problems? PCP, Specialist, Home health nurse, Urgent Care, ED, 911 Yes   Additional teach back comments educated on importance of home BP monitoring daily   Week 1 call completed? Yes   Revoked No further contact(revokes)-requires comment   Is the patient interested in additional calls from an ambulatory ?  NOTE:  applies to high risk patients requiring additional follow-up. No   Graduated/Revoked comments santiago VILLALTA - Registered Nurse

## 2022-12-06 ENCOUNTER — OFFICE VISIT (OUTPATIENT)
Dept: CARDIOLOGY | Facility: CLINIC | Age: 47
End: 2022-12-06

## 2022-12-06 VITALS
SYSTOLIC BLOOD PRESSURE: 118 MMHG | BODY MASS INDEX: 31.97 KG/M2 | DIASTOLIC BLOOD PRESSURE: 62 MMHG | WEIGHT: 236 LBS | HEIGHT: 72 IN | HEART RATE: 77 BPM

## 2022-12-06 DIAGNOSIS — Z72.0 TOBACCO USE: ICD-10-CM

## 2022-12-06 DIAGNOSIS — Z95.5 S/P PRIMARY ANGIOPLASTY WITH CORONARY STENT: ICD-10-CM

## 2022-12-06 DIAGNOSIS — E78.5 HYPERLIPIDEMIA LDL GOAL <70: ICD-10-CM

## 2022-12-06 DIAGNOSIS — E11.9 TYPE 2 DIABETES MELLITUS WITHOUT COMPLICATION, WITHOUT LONG-TERM CURRENT USE OF INSULIN: ICD-10-CM

## 2022-12-06 DIAGNOSIS — I25.2 HISTORY OF ACUTE INFERIOR WALL MI: Primary | ICD-10-CM

## 2022-12-06 DIAGNOSIS — I25.10 CORONARY ARTERY DISEASE INVOLVING NATIVE CORONARY ARTERY OF NATIVE HEART WITHOUT ANGINA PECTORIS: ICD-10-CM

## 2022-12-06 PROCEDURE — 99214 OFFICE O/P EST MOD 30 MIN: CPT | Performed by: NURSE PRACTITIONER

## 2022-12-06 PROCEDURE — 93000 ELECTROCARDIOGRAM COMPLETE: CPT | Performed by: NURSE PRACTITIONER

## 2022-12-06 NOTE — PROGRESS NOTES
Date of Office Visit: 2022  Encounter Provider: ARJUN Jacobo  Place of Service: [facility] CARDIOLOGY  Patient Name: Marcos Odell II  :1975    No chief complaint on file.  : one week  Post STEMI    HPI: Marcos Odell II is a 47 y.o. male who is a patient of Dr. Romero.  He is new to me today and presents for hospital follow-up.  Patient presented to [facility] on  with severe central chest discomfort associated with diaphoresis and nausea.  He was noted to have inferior STEMI.  Patient was taken to the Cath Lab and found to have an acutely occluded distal RCA and subsequently underwent PCI/PRIYA to distal RCA.  He had no other obstructive coronary disease.  Creatinine stable post cath.  Patient was started on DAPT with aspirin and Effient.  Follow-up echocardiogram showed normal left ventricular systolic function and normal diastolic function with no significant valvular disease.    Patient presents today with no complaints of chest pain, pressure, shortness of breath, lightheadedness or edema.  His blood pressure is well controlled on office visit today.  He is not checking his blood pressure on a normal basis.  EKG is stable. Right radial site soft.  We had lengthy discussion about risk factor modification.  He is on statin therapy.  Labs are checked by PCP on a normal basis.  Patient notes that prior to coming to hospital he had been drinking about 2 large energy drinks each day.  He has not had any since that time.  Patient does drink 3 cups of black coffee each day.  Patient previously smoked cigarettes, now smokes a couple of cigars.  He does have family history of premature coronary disease with his paternal grandmother.  Patient's paternal uncle who was very healthy also had MI in his early 60's.  Patient's father, mother and siblings do not have any cardiac issues.  Patient has a physical job.  He will speak with [facility] and is not opposed to it; however, timing may be a factor with his job.      Previous testing and notes have been reviewed by me.   Past Medical History:   Diagnosis Date   • GERD (gastroesophageal reflux disease)    • Sleep apnea     has CPAP, does not use       Past Surgical History:   Procedure Laterality Date   • CARDIAC CATHETERIZATION N/A 11/23/2022    Procedure: Left Heart Cath;  Surgeon: Prabhakar Romero MD;  Location: [facility] CATH INVASIVE LOCATION;  Service: Cardiovascular;  Laterality: N/A;   • CARDIAC CATHETERIZATION N/A 11/23/2022    Procedure: Percutaneous Coronary Intervention;  Surgeon: Prabhakar Romero MD;  [facility];  Service: Cardiovascular;  Laterality: N/A;   • CARDIAC CATHETERIZATION N/A 11/23/2022    Procedure: Stent PRIYA coronary;  Surgeon: Prabhakar Romero MD;  [facility];  Service: Cardiovascular;  Laterality: N/A;   • CARDIAC CATHETERIZATION N/A 11/23/2022    Procedure: Coronary angiography;  Surgeon: Prabhakar Romero MD;  Location: [facility] CATH INVASIVE LOCATION;  Service: Cardiovascular;  Laterality: N/A;   • COLONOSCOPY N/A 10/20/2022    Procedure: COLONOSCOPY TO CECUM WITH COLD POLYPECTOMIES;  Surgeon: Phillip Walker MD;  Location: [facility] ENDOSCOPY;  Service: General;  Laterality: N/A;  SCREENING  COLON POLYPS   • FOOT SURGERY Left     remove a foreign object   • TONSILLECTOMY         Social History     Socioeconomic History   • Marital status:    Tobacco Use   • Smoking status: Never   • Smokeless tobacco: Current     Types: Chew   Vaping Use   • Vaping Use: Never used   Substance and Sexual Activity   • Alcohol use: Yes     Comment: occ   • Drug use: Never   • Sexual activity: Defer       Family History   Problem Relation Age of Onset   • Malig Hyperthermia Neg Hx        Review of Systems   Constitutional: Negative.   HENT: Negative.    Eyes: Negative.    Cardiovascular: Negative.   "  Respiratory: Negative.    Endocrine: Negative.    Hematologic/Lymphatic:        DAPT with asa/effient   Skin: Negative.    Musculoskeletal: Negative.    Gastrointestinal: Negative.    Genitourinary: Negative.    Neurological: Negative.    Psychiatric/Behavioral: Negative.    Allergic/Immunologic: Negative.        Allergies   Allergen Reactions   • Penicillins Other (See Comments)     childhood         Current Outpatient Medications:   •  aspirin 81 MG EC tablet, Take 1 tablet by mouth Daily., Disp: 30 tablet, Rfl: 11  •  atorvastatin (LIPITOR) 40 MG tablet, Take 1 tablet by mouth Every Night., Disp: 30 tablet, Rfl: 5  •  losartan (COZAAR) 25 MG tablet, Take 1 tablet by mouth Daily., Disp: 30 tablet, Rfl: 11  •  metFORMIN ER (Glucophage XR) 500 MG 24 hr tablet, Take 1 tablet by mouth Daily With Breakfast., Disp: 30 tablet, Rfl: 1  •  metoprolol tartrate (LOPRESSOR) 25 MG tablet, Take 1 tablet by mouth Every 12 (Twelve) Hours., Disp: 60 tablet, Rfl: 5  •  omeprazole (priLOSEC) 20 MG capsule, Take 1 capsule by mouth Daily., Disp: , Rfl:   •  prasugrel (EFFIENT) 10 MG tablet, Take 1 tablet by mouth Daily., Disp: 30 tablet, Rfl: 11      Objective:     Vitals:    12/06/22 1441   BP: 118/62   Pulse: 77   Weight: 107 kg (236 lb)   Height: 183 cm (72.03\")     Body mass index is 31.98 kg/m².     2D Echocardiogram 11/24/2022:   •  Left ventricular ejection fraction appears to be 51 - 55%.  •  Left ventricular diastolic function was normal.  Normal RV size and systolic function  No significant valvular disease    Left Heart Cath 11/23/2022:  Left main: Large-caliber vessel bifurcates to an LAD and circumflex.  The left main coronary artery is normal  LAD: Medium caliber vessel that gives rise to a small caliber diagonal branch in the midsegment.  The LAD has luminal irregularities in the midsegment.  Left circumflex: Medium caliber vessel gives rise to a medium caliber OM1 and small caliber OM 2.  The first obtuse marginal " branch has a discrete 40% ostial stenosis  RCA: Large-caliber, dominant vessel that gives rise to a medium caliber PDA and 2 small caliber RPL branches.  Acutely occluded in the distal segment.    Left ventricular angiography: Normal left ventricular size and systolic function.  Basal to mid inferior wall dyskinesis.  Ejection fraction 55%    Intervention: PCI with easement of 3.5 x 23 mm Xience Skypoint drug-eluting stent across the distal RCA.  HA III flow with no complications at completion.     PHYSICAL EXAM:    Constitutional:       Appearance: Healthy appearance. Not in distress.   Neck:      Vascular: No JVR. JVD normal.   Pulmonary:      Effort: Pulmonary effort is normal.      Breath sounds: Normal breath sounds. No wheezing. No rhonchi. No rales.   Chest:      Chest wall: Not tender to palpatation.   Cardiovascular:      PMI at left midclavicular line. Normal rate. Regular rhythm. Normal S1. Normal S2.      Murmurs: There is no murmur.      No gallop. No click. No rub.   Pulses:     Intact distal pulses.   Edema:     Peripheral edema absent.   Abdominal:      General: Bowel sounds are normal.      Palpations: Abdomen is soft.      Tenderness: There is no abdominal tenderness.   Musculoskeletal: Normal range of motion.         General: No tenderness. Skin:     General: Skin is warm and dry.   Neurological:      General: No focal deficit present.      Mental Status: Alert and oriented to person, place and time.           ECG 12 Lead    Date/Time: 12/6/2022 3:33 PM  Performed by: Makeda Arana APRN  Authorized by: Makeda Arana APRN   Comparison: compared with previous ECG from 11/24/2022  Rhythm: sinus rhythm  Rate: normal  BPM: 77  T inversion: III and aVF                Assessment:       Diagnosis Plan   1. History of acute inferior wall MI        2. Coronary artery disease involving native coronary artery of native heart without angina pectoris        3. S/P primary angioplasty with coronary  stent        4. Hyperlipidemia LDL goal <70        5. Tobacco use        6. Type 2 diabetes mellitus without complication, without long-term current use of insulin ([facility])          Orders Placed This Encounter   Procedures   • ECG 12 Lead     This order was created via procedure documentation     Order Specific Question:   Release to patient     Answer:   Routine Release          Plan:       1.  History of inferior STEMI: normal LVEF 55%  2.  Coronary artery disease status post PCI/PRIYA to distal RCA: DAPT with aspirin/Effient, beta-blocker and statin.  NO angina. Stable EKG. NO caffeine drinks.   3.  Hyperlipidemia: Lipid panel shows total cholesterol 198, triglycerides 359, HDL 32, .  LDL less than 70.  Placed on statin while in hospital. Dietary modifications.  Lipid panel in approx 6 months.  Labs checked by PCP.   4.  Diabetes type 2: Hemoglobin A1c 6.7.  On oral medication  5.  Tobacco abuse: smoking cessation recommended    Patient will follow up with Dr. Romero in 6-8 weeks.  He will call sooner for any questions or concerns.            Your medication list          Accurate as of December 6, 2022  3:36 PM. If you have any questions, ask your nurse or doctor.            CONTINUE taking these medications      Instructions Last Dose Given Next Dose Due   aspirin 81 MG EC tablet      Take 1 tablet by mouth Daily.       atorvastatin 40 MG tablet  Commonly known as: LIPITOR      Take 1 tablet by mouth Every Night.       losartan 25 MG tablet  Commonly known as: COZAAR      Take 1 tablet by mouth Daily.       metFORMIN  MG 24 hr tablet  Commonly known as: Glucophage XR      Take 1 tablet by mouth Daily With Breakfast.       metoprolol tartrate 25 MG tablet  Commonly known as: LOPRESSOR      Take 1 tablet by mouth Every 12 (Twelve) Hours.       omeprazole 20 MG capsule  Commonly known as: priLOSEC      Take 1 capsule by mouth Daily.       prasugrel 10 MG tablet  Commonly known as: EFFIENT      Take 1  tablet by mouth Daily.                As always, it has been a pleasure to participate in your patient's care.      Sincerely,       ARJUN Raygoza

## 2022-12-27 ENCOUNTER — HOSPITAL ENCOUNTER (OUTPATIENT)
Facility: HOSPITAL | Age: 47
Setting detail: OBSERVATION
Discharge: HOME | End: 2022-12-28
Attending: EMERGENCY MEDICINE | Admitting: EMERGENCY MEDICINE
Payer: COMMERCIAL

## 2022-12-27 ENCOUNTER — APPOINTMENT (OUTPATIENT)
Dept: GENERAL RADIOLOGY | Facility: HOSPITAL | Age: 47
End: 2022-12-27
Payer: COMMERCIAL

## 2022-12-27 DIAGNOSIS — R55 SYNCOPE AND COLLAPSE: Primary | ICD-10-CM

## 2022-12-27 DIAGNOSIS — Z86.79 HISTORY OF CORONARY ARTERY DISEASE: ICD-10-CM

## 2022-12-27 LAB
ALBUMIN SERPL-MCNC: 3.9 G/DL (ref 3.5–5.2)
ALBUMIN/GLOB SERPL: 1.7 G/DL
ALP SERPL-CCNC: 80 U/L (ref 39–117)
ALT SERPL W P-5'-P-CCNC: 27 U/L (ref 1–41)
AMPHET+METHAMPHET UR QL: NEGATIVE
ANION GAP SERPL CALCULATED.3IONS-SCNC: 8.2 MMOL/L (ref 5–15)
AST SERPL-CCNC: 18 U/L (ref 1–40)
BARBITURATES UR QL SCN: NEGATIVE
BASOPHILS # BLD AUTO: 0.04 10*3/MM3 (ref 0–0.2)
BASOPHILS NFR BLD AUTO: 0.4 % (ref 0–1.5)
BENZODIAZ UR QL SCN: NEGATIVE
BILIRUB SERPL-MCNC: 0.6 MG/DL (ref 0–1.2)
BILIRUB UR QL STRIP: NEGATIVE
BUN SERPL-MCNC: 10 MG/DL (ref 6–20)
BUN/CREAT SERPL: 12.3 (ref 7–25)
CALCIUM SPEC-SCNC: 8.7 MG/DL (ref 8.6–10.5)
CANNABINOIDS SERPL QL: NEGATIVE
CHLORIDE SERPL-SCNC: 106 MMOL/L (ref 98–107)
CLARITY UR: CLEAR
CO2 SERPL-SCNC: 22.8 MMOL/L (ref 22–29)
COCAINE UR QL: NEGATIVE
COLOR UR: YELLOW
CREAT SERPL-MCNC: 0.81 MG/DL (ref 0.76–1.27)
D-LACTATE SERPL-SCNC: 1.9 MMOL/L (ref 0.5–2)
DEPRECATED RDW RBC AUTO: 42 FL (ref 37–54)
EGFRCR SERPLBLD CKD-EPI 2021: 110.1 ML/MIN/1.73
EOSINOPHIL # BLD AUTO: 0.12 10*3/MM3 (ref 0–0.4)
EOSINOPHIL NFR BLD AUTO: 1.1 % (ref 0.3–6.2)
ERYTHROCYTE [DISTWIDTH] IN BLOOD BY AUTOMATED COUNT: 12.9 % (ref 12.3–15.4)
ETHANOL BLD-MCNC: <10 MG/DL (ref 0–10)
ETHANOL UR QL: <0.01 %
GLOBULIN UR ELPH-MCNC: 2.3 GM/DL
GLUCOSE SERPL-MCNC: 177 MG/DL (ref 65–99)
GLUCOSE UR STRIP-MCNC: NEGATIVE MG/DL
HCT VFR BLD AUTO: 42.2 % (ref 37.5–51)
HGB BLD-MCNC: 14 G/DL (ref 13–17.7)
HGB UR QL STRIP.AUTO: NEGATIVE
IMM GRANULOCYTES # BLD AUTO: 0.03 10*3/MM3 (ref 0–0.05)
IMM GRANULOCYTES NFR BLD AUTO: 0.3 % (ref 0–0.5)
KETONES UR QL STRIP: NEGATIVE
LEUKOCYTE ESTERASE UR QL STRIP.AUTO: NEGATIVE
LYMPHOCYTES # BLD AUTO: 1.17 10*3/MM3 (ref 0.7–3.1)
LYMPHOCYTES NFR BLD AUTO: 10.9 % (ref 19.6–45.3)
MCH RBC QN AUTO: 29.8 PG (ref 26.6–33)
MCHC RBC AUTO-ENTMCNC: 33.2 G/DL (ref 31.5–35.7)
MCV RBC AUTO: 89.8 FL (ref 79–97)
METHADONE UR QL SCN: NEGATIVE
MONOCYTES # BLD AUTO: 0.65 10*3/MM3 (ref 0.1–0.9)
MONOCYTES NFR BLD AUTO: 6.1 % (ref 5–12)
NEUTROPHILS NFR BLD AUTO: 8.72 10*3/MM3 (ref 1.7–7)
NEUTROPHILS NFR BLD AUTO: 81.2 % (ref 42.7–76)
NITRITE UR QL STRIP: NEGATIVE
NRBC BLD AUTO-RTO: 0 /100 WBC (ref 0–0.2)
OPIATES UR QL: NEGATIVE
OXYCODONE UR QL SCN: NEGATIVE
PH UR STRIP.AUTO: 6.5 [PH] (ref 5–8)
PLATELET # BLD AUTO: 268 10*3/MM3 (ref 140–450)
PMV BLD AUTO: 9.6 FL (ref 6–12)
POTASSIUM SERPL-SCNC: 3.6 MMOL/L (ref 3.5–5.2)
PROT SERPL-MCNC: 6.2 G/DL (ref 6–8.5)
PROT UR QL STRIP: NEGATIVE
QT INTERVAL: 398 MS
RBC # BLD AUTO: 4.7 10*6/MM3 (ref 4.14–5.8)
SODIUM SERPL-SCNC: 137 MMOL/L (ref 136–145)
SP GR UR STRIP: 1.01 (ref 1–1.03)
TROPONIN T SERPL-MCNC: <0.01 NG/ML (ref 0–0.03)
UROBILINOGEN UR QL STRIP: NORMAL
WBC NRBC COR # BLD: 10.73 10*3/MM3 (ref 3.4–10.8)

## 2022-12-27 PROCEDURE — 80307 DRUG TEST PRSMV CHEM ANLYZR: CPT | Performed by: EMERGENCY MEDICINE

## 2022-12-27 PROCEDURE — 84484 ASSAY OF TROPONIN QUANT: CPT | Performed by: EMERGENCY MEDICINE

## 2022-12-27 PROCEDURE — 80053 COMPREHEN METABOLIC PANEL: CPT | Performed by: EMERGENCY MEDICINE

## 2022-12-27 PROCEDURE — 82077 ASSAY SPEC XCP UR&BREATH IA: CPT | Performed by: EMERGENCY MEDICINE

## 2022-12-27 PROCEDURE — 81003 URINALYSIS AUTO W/O SCOPE: CPT | Performed by: EMERGENCY MEDICINE

## 2022-12-27 PROCEDURE — 83605 ASSAY OF LACTIC ACID: CPT | Performed by: EMERGENCY MEDICINE

## 2022-12-27 PROCEDURE — G0378 HOSPITAL OBSERVATION PER HR: HCPCS

## 2022-12-27 PROCEDURE — 93005 ELECTROCARDIOGRAM TRACING: CPT

## 2022-12-27 PROCEDURE — 36415 COLL VENOUS BLD VENIPUNCTURE: CPT

## 2022-12-27 PROCEDURE — 99214 OFFICE O/P EST MOD 30 MIN: CPT | Performed by: INTERNAL MEDICINE

## 2022-12-27 PROCEDURE — 85025 COMPLETE CBC W/AUTO DIFF WBC: CPT | Performed by: EMERGENCY MEDICINE

## 2022-12-27 PROCEDURE — 84484 ASSAY OF TROPONIN QUANT: CPT | Performed by: PHYSICIAN ASSISTANT

## 2022-12-27 PROCEDURE — 71045 X-RAY EXAM CHEST 1 VIEW: CPT

## 2022-12-27 PROCEDURE — 99285 EMERGENCY DEPT VISIT HI MDM: CPT

## 2022-12-27 RX ORDER — SODIUM CHLORIDE 0.9 % (FLUSH) 0.9 %
10 SYRINGE (ML) INJECTION EVERY 12 HOURS SCHEDULED
Status: DISCONTINUED | OUTPATIENT
Start: 2022-12-27 | End: 2022-12-28 | Stop reason: HOSPADM

## 2022-12-27 RX ORDER — ATORVASTATIN CALCIUM 20 MG/1
40 TABLET, FILM COATED ORAL NIGHTLY
Status: DISCONTINUED | OUTPATIENT
Start: 2022-12-27 | End: 2022-12-28 | Stop reason: HOSPADM

## 2022-12-27 RX ORDER — LOSARTAN POTASSIUM 25 MG/1
25 TABLET ORAL
Status: DISCONTINUED | OUTPATIENT
Start: 2022-12-28 | End: 2022-12-28 | Stop reason: HOSPADM

## 2022-12-27 RX ORDER — CHOLECALCIFEROL (VITAMIN D3) 125 MCG
5 CAPSULE ORAL NIGHTLY PRN
Status: DISCONTINUED | OUTPATIENT
Start: 2022-12-27 | End: 2022-12-28 | Stop reason: HOSPADM

## 2022-12-27 RX ORDER — PRASUGREL 10 MG/1
10 TABLET, FILM COATED ORAL DAILY
Status: DISCONTINUED | OUTPATIENT
Start: 2022-12-28 | End: 2022-12-28 | Stop reason: HOSPADM

## 2022-12-27 RX ORDER — ASPIRIN 81 MG/1
81 TABLET ORAL DAILY
Status: DISCONTINUED | OUTPATIENT
Start: 2022-12-28 | End: 2022-12-28 | Stop reason: HOSPADM

## 2022-12-27 RX ORDER — ONDANSETRON 4 MG/1
4 TABLET, FILM COATED ORAL EVERY 6 HOURS PRN
Status: DISCONTINUED | OUTPATIENT
Start: 2022-12-27 | End: 2022-12-28 | Stop reason: HOSPADM

## 2022-12-27 RX ORDER — SODIUM CHLORIDE 9 MG/ML
40 INJECTION, SOLUTION INTRAVENOUS AS NEEDED
Status: DISCONTINUED | OUTPATIENT
Start: 2022-12-27 | End: 2022-12-28 | Stop reason: HOSPADM

## 2022-12-27 RX ORDER — SODIUM CHLORIDE 0.9 % (FLUSH) 0.9 %
10 SYRINGE (ML) INJECTION AS NEEDED
Status: DISCONTINUED | OUTPATIENT
Start: 2022-12-27 | End: 2022-12-28 | Stop reason: HOSPADM

## 2022-12-27 RX ORDER — ONDANSETRON 2 MG/ML
4 INJECTION INTRAMUSCULAR; INTRAVENOUS EVERY 6 HOURS PRN
Status: DISCONTINUED | OUTPATIENT
Start: 2022-12-27 | End: 2022-12-28 | Stop reason: HOSPADM

## 2022-12-27 RX ORDER — NITROGLYCERIN 0.4 MG/1
0.4 TABLET SUBLINGUAL
Status: DISCONTINUED | OUTPATIENT
Start: 2022-12-27 | End: 2022-12-28 | Stop reason: HOSPADM

## 2022-12-27 RX ORDER — METFORMIN HYDROCHLORIDE 500 MG/1
500 TABLET, EXTENDED RELEASE ORAL
Status: DISCONTINUED | OUTPATIENT
Start: 2022-12-28 | End: 2022-12-28 | Stop reason: HOSPADM

## 2022-12-27 RX ORDER — SODIUM CHLORIDE, SODIUM LACTATE, POTASSIUM CHLORIDE, CALCIUM CHLORIDE 600; 310; 30; 20 MG/100ML; MG/100ML; MG/100ML; MG/100ML
100 INJECTION, SOLUTION INTRAVENOUS CONTINUOUS
Status: DISCONTINUED | OUTPATIENT
Start: 2022-12-28 | End: 2022-12-28 | Stop reason: HOSPADM

## 2022-12-27 RX ORDER — ACETAMINOPHEN 325 MG/1
650 TABLET ORAL EVERY 4 HOURS PRN
Status: DISCONTINUED | OUTPATIENT
Start: 2022-12-27 | End: 2022-12-28 | Stop reason: HOSPADM

## 2022-12-27 RX ORDER — PANTOPRAZOLE SODIUM 40 MG/1
40 TABLET, DELAYED RELEASE ORAL EVERY MORNING
Status: DISCONTINUED | OUTPATIENT
Start: 2022-12-28 | End: 2022-12-28 | Stop reason: HOSPADM

## 2022-12-27 RX ADMIN — Medication 10 ML: at 13:09

## 2022-12-27 RX ADMIN — Medication 10 ML: at 21:00

## 2022-12-27 RX ADMIN — ATORVASTATIN CALCIUM 40 MG: 20 TABLET, FILM COATED ORAL at 20:06

## 2022-12-27 RX ADMIN — METOPROLOL TARTRATE 25 MG: 25 TABLET ORAL at 20:06

## 2022-12-27 NOTE — ED NOTES
.Nursing report ED to floor  Marcos Odell II  47 y.o.  male    HPI :   Chief Complaint   Patient presents with    Syncope       Admitting doctor:   Tone Kessler MD    Admitting diagnosis:   The primary encounter diagnosis was Syncope and collapse. A diagnosis of History of coronary artery disease was also pertinent to this visit.    Code status:   Current Code Status       Date Active Code Status Order ID Comments User Context       Not on file            Allergies:   Penicillins    Isolation:   No active isolations    Intake and Output  No intake or output data in the 24 hours ending 12/27/22 1259    Weight:       12/27/22  1131   Weight: 102 kg (225 lb)       Most recent vitals:   Vitals:    12/27/22 1207 12/27/22 1209 12/27/22 1230 12/27/22 1231   BP: 114/80 122/75  128/76   BP Location:    Right arm   Patient Position: Standing Standing  Lying   Pulse: 82 79 69 71   Resp:    17   Temp:       SpO2:   98% 98%   Weight:       Height:           Active LDAs/IV Access:   Lines, Drains & Airways       Active LDAs       Name Placement date Placement time Site Days    Peripheral IV 12/27/22 Left Antecubital 12/27/22  --  Antecubital  less than 1                    Labs (abnormal labs have a star):   Labs Reviewed   COMPREHENSIVE METABOLIC PANEL - Abnormal; Notable for the following components:       Result Value    Glucose 177 (*)     All other components within normal limits    Narrative:     GFR Normal >60  Chronic Kidney Disease <60  Kidney Failure <15     CBC WITH AUTO DIFFERENTIAL - Abnormal; Notable for the following components:    Neutrophil % 81.2 (*)     Lymphocyte % 10.9 (*)     Neutrophils, Absolute 8.72 (*)     All other components within normal limits   URINALYSIS W/ MICROSCOPIC IF INDICATED (NO CULTURE) - Normal    Narrative:     Urine microscopic not indicated.   TROPONIN (IN-HOUSE) - Normal    Narrative:     Troponin T Reference Range:  <= 0.03 ng/mL-   Negative for AMI  >0.03 ng/mL-      Abnormal for myocardial necrosis.  Clinicians would have to utilize clinical acumen, EKG, Troponin and serial changes to determine if it is an Acute Myocardial Infarction or myocardial injury due to an underlying chronic condition.       Results may be falsely decreased if patient taking Biotin.     LACTIC ACID, PLASMA - Normal   URINE DRUG SCREEN - Normal    Narrative:     Negative Thresholds Per Drugs Screened:    Amphetamines                 500 ng/ml  Barbiturates                 200 ng/ml  Benzodiazepines              100 ng/ml  Cocaine                      300 ng/ml  Methadone                    300 ng/ml  Opiates                      300 ng/ml  Oxycodone                    100 ng/ml  THC                           50 ng/ml    The Normal Value for all drugs tested is negative. This report includes final unconfirmed screening results to be used for medical treatment purposes only. Unconfirmed results must not be used for non-medical purposes such as employment or legal testing. Clinical consideration should be applied to any drug of abuse test, particularly when unconfirmed results are used.           ETHANOL   TROPONIN (IN-HOUSE)   CBC AND DIFFERENTIAL    Narrative:     The following orders were created for panel order CBC & Differential.  Procedure                               Abnormality         Status                     ---------                               -----------         ------                     CBC Auto Differential[[id number]]        Abnormal            Final result                 Please view results for these tests on the individual orders.       EKG:   ECG 12 Lead Syncope   Preliminary Result   HEART RATE= 70  bpm   RR Interval= 857  ms   MI Interval= 163  ms   P Horizontal Axis= 10  deg   P Front Axis= 39  deg   QRSD Interval= 106  ms   QT Interval= 398  ms   QRS Axis= 39  deg   T Wave Axis= -27  deg   - ABNORMAL ECG -   Sinus rhythm   Probable inferior infarct, age indeterminate    Electronically Signed By:    Date and Time of Study: 2022-12-27 11:20:42          Meds given in ED:   Medications   sodium chloride 0.9 % flush 10 mL (has no administration in time range)       Imaging results:  XR Chest 1 View    Result Date: 12/27/2022  No evidence for acute pulmonary process. Follow-up as clinical indications persist.  This report was finalized on 12/27/2022 12:00 PM by Dr. Ford Tolbert M.D.       Ambulatory status:   - assist stand by    Social issues:   Social History     Socioeconomic History    Marital status:    Tobacco Use    Smoking status: Never    Smokeless tobacco: Current     Types: Chew   Vaping Use    Vaping Use: Never used   Substance and Sexual Activity    Alcohol use: Yes     Comment: occ    Drug use: Never    Sexual activity: Defer       NIH Stroke Scale:         Alisia Vega RN  12/27/22 12:59 EST

## 2022-12-27 NOTE — ED TRIAGE NOTES
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 Pt received a liter bolus en route.    Pt was wearing a mask during assessment.  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

## 2022-12-27 NOTE — LETTER
December 28, 2022      [facility]  [address] [facility] [address]  [phone]          Patient: Marcos Odell II   YOB: 1975   Date of Visit: 12/27/2022       To Whom It May Concern:    Marcos Odell II was seen at [facility] 12/28/2022- 12/27/2022.            Sincerely,             Samantha Omer PA-C
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
## 2022-12-27 NOTE — CASE MANAGEMENT/SOCIAL WORK
Discharge Planning Assessment  [facility]     Patient Name: Marcos Odell II  MRN: [id number]  Today's Date: 12/27/2022    Admit Date: 12/27/2022    Plan: Plans to return home at d/c-VICKIE Foster RN   Discharge Needs Assessment     Row Name 12/27/22 1406       Living Environment    People in Home spouse;child(sarina), adult    Name(s) of People in Home wife Shonda and two adult sons    Current Living Arrangements home    Primary Care Provided by self    Provides Primary Care For no one    Family Caregiver if Needed spouse    Family Caregiver Names Shonda    Quality of Family Relationships supportive    Able to Return to Prior Arrangements yes       Resource/Environmental Concerns    Resource/Environmental Concerns none       Transition Planning    Patient/Family Anticipates Transition to home with family    Patient/Family Anticipated Services at Transition none    Transportation Anticipated family or friend will provide       Discharge Needs Assessment    Equipment Currently Used at Home glucometer    Concerns to be Addressed no discharge needs identified    Anticipated Changes Related to Illness none    Equipment Needed After Discharge none    Provided Post Acute Provider List? N/A    Provided Post Acute Provider Quality & Resource List? N/A               Discharge Plan     Row Name 12/27/22 3674       Plan    Plan Plans to return home at d/cEMA Foster RN    Patient/Family in Agreement with Plan yes    Provided Post Acute Provider List? N/A    Provided Post Acute Provider Quality & Resource List? N/A    Plan Comments Spoke w/ patient at bedside w/ his permission.Introduced self and explained role. Lives in home w/ wife Shonda and two adult sons. Able to maneuver steps and around home w/o difficulty. Independent w/ ADLs. Has BGM at home. No assistive devices used. Denies need for any DME or community resources at d/c, at this time. Uses [facility] in [place], and is able to  and pay for meds.  To return home at d/c, with assist from family as needed (family will transport), and is agreeable w/ plan. CM will continue to follow-VICKIE Foster RN              Continued Care and Services - Admitted Since 12/27/2022    Coordination has not been started for this encounter.          Demographic Summary     Row Name 12/27/22 1406       General Information    Admission Type observation    Arrived From emergency department    Referral Source admission list    Reason for Consult discharge planning    Preferred Language English               Functional Status     Row Name 12/27/22 1406       Functional Status    Usual Activity Tolerance good    Current Activity Tolerance good       Functional Status, IADL    Medications independent       Mental Status    General Appearance WDL WDL       Mental Status Summary    Recent Changes in Mental Status/Cognitive Functioning no changes               Psychosocial     Row Name 12/27/22 1406       Behavior WDL    Behavior WDL WDL       Emotion Mood WDL    Emotion/Mood/Affect WDL WDL       Speech WDL    Speech WDL WDL       Perceptual State WDL    Perceptual State WDL WDL       Thought Process WDL    Thought Process WDL WDL       Intellectual Performance WDL    Intellectual Performance WDL WDL               Abuse/Neglect    No documentation.                Legal    No documentation.                Substance Abuse    No documentation.                Patient Forms    No documentation.                   Joceline Foster RN

## 2022-12-27 NOTE — PLAN OF CARE
Goal Outcome Evaluation:  Plan of Care Reviewed With: patient   Pt admitted to obs for syncopal episode at work. Pt recent stent placement for MI in November. Plan is NPO after MN for cardiology consult. Pt alert and orient times 4, NAD, up ad lele, VSS. Pt RSR on the monitor. Spouse at bedside. Will continue to monitor.      Progress: improving

## 2022-12-27 NOTE — LETTER
December 28, 2022     Patient: Marcos Odell II   YOB: 1975   Date of Visit: 12/27/2022       To Whom It May Concern:    Marcos Odell was seen in the [facility] Observation Unit 12/27/2022 to 12/28/2022.  It is my medical opinion that Marcos Odell should remain out of work until 12/31/2022.           Sincerely,              Samantha Omer PA-C
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
## 2022-12-28 ENCOUNTER — APPOINTMENT (OUTPATIENT)
Dept: CARDIOLOGY | Facility: HOSPITAL | Age: 47
End: 2022-12-28
Payer: COMMERCIAL

## 2022-12-28 VITALS
RESPIRATION RATE: 16 BRPM | TEMPERATURE: 98 F | WEIGHT: 224.87 LBS | HEART RATE: 76 BPM | DIASTOLIC BLOOD PRESSURE: 91 MMHG | BODY MASS INDEX: 30.46 KG/M2 | HEIGHT: 72 IN | SYSTOLIC BLOOD PRESSURE: 115 MMHG | OXYGEN SATURATION: 98 %

## 2022-12-28 LAB
ANION GAP SERPL CALCULATED.3IONS-SCNC: 9.1 MMOL/L (ref 5–15)
AORTIC DIMENSIONLESS INDEX: 0.9 (DI)
ASCENDING AORTA: 2.6 CM
BH CV ECHO MEAS - ACS: 1.48 CM
BH CV ECHO MEAS - AO MAX PG: 6.5 MMHG
BH CV ECHO MEAS - AO MEAN PG: 3.5 MMHG
BH CV ECHO MEAS - AO ROOT DIAM: 3.1 CM
BH CV ECHO MEAS - AO V2 MAX: 127.6 CM/SEC
BH CV ECHO MEAS - AO V2 VTI: 26.1 CM
BH CV ECHO MEAS - AVA(I,D): 3 CM2
BH CV ECHO MEAS - EDV(CUBED): 103.6 ML
BH CV ECHO MEAS - EDV(MOD-SP2): 95 ML
BH CV ECHO MEAS - EDV(MOD-SP4): 128 ML
BH CV ECHO MEAS - EF(MOD-BP): 55 %
BH CV ECHO MEAS - EF(MOD-SP2): 53.7 %
BH CV ECHO MEAS - EF(MOD-SP4): 57 %
BH CV ECHO MEAS - ESV(CUBED): 41.6 ML
BH CV ECHO MEAS - ESV(MOD-SP2): 44 ML
BH CV ECHO MEAS - ESV(MOD-SP4): 55 ML
BH CV ECHO MEAS - FS: 26.3 %
BH CV ECHO MEAS - IVS/LVPW: 0.95 CM
BH CV ECHO MEAS - IVSD: 0.9 CM
BH CV ECHO MEAS - LAT PEAK E' VEL: 12.1 CM/SEC
BH CV ECHO MEAS - LV MASS(C)D: 148.9 GRAMS
BH CV ECHO MEAS - LV MAX PG: 5.4 MMHG
BH CV ECHO MEAS - LV MEAN PG: 2.35 MMHG
BH CV ECHO MEAS - LV V1 MAX: 116.5 CM/SEC
BH CV ECHO MEAS - LV V1 VTI: 23.2 CM
BH CV ECHO MEAS - LVIDD: 4.7 CM
BH CV ECHO MEAS - LVIDS: 3.5 CM
BH CV ECHO MEAS - LVOT AREA: 3.4 CM2
BH CV ECHO MEAS - LVOT DIAM: 2.09 CM
BH CV ECHO MEAS - LVPWD: 0.95 CM
BH CV ECHO MEAS - MED PEAK E' VEL: 9.6 CM/SEC
BH CV ECHO MEAS - MV A DUR: 0.17 SEC
BH CV ECHO MEAS - MV A MAX VEL: 82.7 CM/SEC
BH CV ECHO MEAS - MV DEC SLOPE: 520.3 CM/SEC2
BH CV ECHO MEAS - MV DEC TIME: 0.2 MSEC
BH CV ECHO MEAS - MV E MAX VEL: 83.6 CM/SEC
BH CV ECHO MEAS - MV E/A: 1.01
BH CV ECHO MEAS - MV MAX PG: 5 MMHG
BH CV ECHO MEAS - MV MEAN PG: 1.82 MMHG
BH CV ECHO MEAS - MV P1/2T: 63.4 MSEC
BH CV ECHO MEAS - MV V2 VTI: 35.5 CM
BH CV ECHO MEAS - MVA(P1/2T): 3.5 CM2
BH CV ECHO MEAS - MVA(VTI): 2.24 CM2
BH CV ECHO MEAS - PA ACC TIME: 0.15 SEC
BH CV ECHO MEAS - PA PR(ACCEL): 11.3 MMHG
BH CV ECHO MEAS - PA V2 MAX: 101.8 CM/SEC
BH CV ECHO MEAS - PULM A REVS DUR: 0.11 SEC
BH CV ECHO MEAS - PULM A REVS VEL: 24.4 CM/SEC
BH CV ECHO MEAS - PULM DIAS VEL: 23.6 CM/SEC
BH CV ECHO MEAS - PULM S/D: 1.11
BH CV ECHO MEAS - PULM SYS VEL: 26.1 CM/SEC
BH CV ECHO MEAS - QP/QS: 0.42
BH CV ECHO MEAS - RV MAX PG: 0.97 MMHG
BH CV ECHO MEAS - RV V1 MAX: 49.3 CM/SEC
BH CV ECHO MEAS - RV V1 VTI: 11.2 CM
BH CV ECHO MEAS - RVOT DIAM: 1.96 CM
BH CV ECHO MEAS - SV(LVOT): 79.4 ML
BH CV ECHO MEAS - SV(MOD-SP2): 51 ML
BH CV ECHO MEAS - SV(MOD-SP4): 73 ML
BH CV ECHO MEAS - SV(RVOT): 33.7 ML
BH CV ECHO MEAS - TAPSE (>1.6): 2.44 CM
BH CV ECHO MEASUREMENTS AVERAGE E/E' RATIO: 7.71
BH CV XLRA - RV BASE: 3.6 CM
BH CV XLRA - RV LENGTH: 8.6 CM
BH CV XLRA - RV MID: 3.7 CM
BH CV XLRA - TDI S': 11.9 CM/SEC
BH CV XLRA MEAS LEFT DIST CCA EDV: -27 CM/SEC
BH CV XLRA MEAS LEFT DIST CCA PSV: -80.9 CM/SEC
BH CV XLRA MEAS LEFT DIST ICA EDV: -34.6 CM/SEC
BH CV XLRA MEAS LEFT DIST ICA PSV: -78 CM/SEC
BH CV XLRA MEAS LEFT ICA/CCA RATIO: 1.09
BH CV XLRA MEAS LEFT MID ICA EDV: -33.4 CM/SEC
BH CV XLRA MEAS LEFT MID ICA PSV: -70.9 CM/SEC
BH CV XLRA MEAS LEFT PROX CCA EDV: 30.6 CM/SEC
BH CV XLRA MEAS LEFT PROX CCA PSV: 127 CM/SEC
BH CV XLRA MEAS LEFT PROX ECA EDV: -14.7 CM/SEC
BH CV XLRA MEAS LEFT PROX ECA PSV: -95.6 CM/SEC
BH CV XLRA MEAS LEFT PROX ICA EDV: -27 CM/SEC
BH CV XLRA MEAS LEFT PROX ICA PSV: -87.4 CM/SEC
BH CV XLRA MEAS LEFT PROX SCLA PSV: 155 CM/SEC
BH CV XLRA MEAS LEFT VERTEBRAL A EDV: -16.5 CM/SEC
BH CV XLRA MEAS LEFT VERTEBRAL A PSV: -58.1 CM/SEC
BH CV XLRA MEAS RIGHT DIST CCA EDV: -21.7 CM/SEC
BH CV XLRA MEAS RIGHT DIST CCA PSV: -80.9 CM/SEC
BH CV XLRA MEAS RIGHT DIST ICA EDV: -38.1 CM/SEC
BH CV XLRA MEAS RIGHT DIST ICA PSV: -91.5 CM/SEC
BH CV XLRA MEAS RIGHT ICA/CCA RATIO: 1.3
BH CV XLRA MEAS RIGHT MID ICA EDV: -42.2 CM/SEC
BH CV XLRA MEAS RIGHT MID ICA PSV: -104 CM/SEC
BH CV XLRA MEAS RIGHT PROX CCA EDV: 34.6 CM/SEC
BH CV XLRA MEAS RIGHT PROX CCA PSV: 121 CM/SEC
BH CV XLRA MEAS RIGHT PROX ECA EDV: -13.5 CM/SEC
BH CV XLRA MEAS RIGHT PROX ECA PSV: -97.3 CM/SEC
BH CV XLRA MEAS RIGHT PROX ICA EDV: -41.6 CM/SEC
BH CV XLRA MEAS RIGHT PROX ICA PSV: -105 CM/SEC
BH CV XLRA MEAS RIGHT PROX SCLA PSV: 154 CM/SEC
BH CV XLRA MEAS RIGHT VERTEBRAL A EDV: -16.9 CM/SEC
BH CV XLRA MEAS RIGHT VERTEBRAL A PSV: -45.2 CM/SEC
BUN SERPL-MCNC: 7 MG/DL (ref 6–20)
BUN/CREAT SERPL: 11.7 (ref 7–25)
CALCIUM SPEC-SCNC: 8.4 MG/DL (ref 8.6–10.5)
CHLORIDE SERPL-SCNC: 108 MMOL/L (ref 98–107)
CO2 SERPL-SCNC: 21.9 MMOL/L (ref 22–29)
CREAT SERPL-MCNC: 0.6 MG/DL (ref 0.76–1.27)
DEPRECATED RDW RBC AUTO: 40.9 FL (ref 37–54)
EGFRCR SERPLBLD CKD-EPI 2021: 120.6 ML/MIN/1.73
ERYTHROCYTE [DISTWIDTH] IN BLOOD BY AUTOMATED COUNT: 12.7 % (ref 12.3–15.4)
GLUCOSE SERPL-MCNC: 149 MG/DL (ref 65–99)
HCT VFR BLD AUTO: 39.8 % (ref 37.5–51)
HGB BLD-MCNC: 13.7 G/DL (ref 13–17.7)
LEFT ATRIUM VOLUME INDEX: 17.1 ML/M2
MAXIMAL PREDICTED HEART RATE: 174 BPM
MAXIMAL PREDICTED HEART RATE: 174 BPM
MCH RBC QN AUTO: 30.8 PG (ref 26.6–33)
MCHC RBC AUTO-ENTMCNC: 34.4 G/DL (ref 31.5–35.7)
MCV RBC AUTO: 89.4 FL (ref 79–97)
PLATELET # BLD AUTO: 234 10*3/MM3 (ref 140–450)
PMV BLD AUTO: 9.5 FL (ref 6–12)
POTASSIUM SERPL-SCNC: 3.9 MMOL/L (ref 3.5–5.2)
QT INTERVAL: 418 MS
RBC # BLD AUTO: 4.45 10*6/MM3 (ref 4.14–5.8)
SINUS: 2.9 CM
SODIUM SERPL-SCNC: 139 MMOL/L (ref 136–145)
STJ: 2.16 CM
STRESS TARGET HR: 148 BPM
STRESS TARGET HR: 148 BPM
TROPONIN T SERPL-MCNC: <0.01 NG/ML (ref 0–0.03)
WBC NRBC COR # BLD: 8.04 10*3/MM3 (ref 3.4–10.8)

## 2022-12-28 PROCEDURE — 93325 DOPPLER ECHO COLOR FLOW MAPG: CPT | Performed by: INTERNAL MEDICINE

## 2022-12-28 PROCEDURE — 84484 ASSAY OF TROPONIN QUANT: CPT | Performed by: PHYSICIAN ASSISTANT

## 2022-12-28 PROCEDURE — G0378 HOSPITAL OBSERVATION PER HR: HCPCS

## 2022-12-28 PROCEDURE — 93880 EXTRACRANIAL BILAT STUDY: CPT

## 2022-12-28 PROCEDURE — 93246 EXT ECG>7D<15D RECORDING: CPT

## 2022-12-28 PROCEDURE — 93308 TTE F-UP OR LMTD: CPT

## 2022-12-28 PROCEDURE — 96361 HYDRATE IV INFUSION ADD-ON: CPT

## 2022-12-28 PROCEDURE — 93321 DOPPLER ECHO F-UP/LMTD STD: CPT | Performed by: INTERNAL MEDICINE

## 2022-12-28 PROCEDURE — 85027 COMPLETE CBC AUTOMATED: CPT | Performed by: PHYSICIAN ASSISTANT

## 2022-12-28 PROCEDURE — 93321 DOPPLER ECHO F-UP/LMTD STD: CPT

## 2022-12-28 PROCEDURE — 93325 DOPPLER ECHO COLOR FLOW MAPG: CPT

## 2022-12-28 PROCEDURE — 96360 HYDRATION IV INFUSION INIT: CPT

## 2022-12-28 PROCEDURE — 93308 TTE F-UP OR LMTD: CPT | Performed by: INTERNAL MEDICINE

## 2022-12-28 PROCEDURE — 80048 BASIC METABOLIC PNL TOTAL CA: CPT | Performed by: PHYSICIAN ASSISTANT

## 2022-12-28 PROCEDURE — 99214 OFFICE O/P EST MOD 30 MIN: CPT | Performed by: NURSE PRACTITIONER

## 2022-12-28 PROCEDURE — 25010000002 PERFLUTREN (DEFINITY) 8.476 MG IN SODIUM CHLORIDE (PF) 0.9 % 10 ML INJECTION: Performed by: INTERNAL MEDICINE

## 2022-12-28 PROCEDURE — 93005 ELECTROCARDIOGRAM TRACING: CPT | Performed by: INTERNAL MEDICINE

## 2022-12-28 RX ADMIN — LOSARTAN POTASSIUM 25 MG: 25 TABLET, FILM COATED ORAL at 11:26

## 2022-12-28 RX ADMIN — METOPROLOL TARTRATE 25 MG: 25 TABLET ORAL at 11:26

## 2022-12-28 RX ADMIN — METFORMIN HYDROCHLORIDE 500 MG: 500 TABLET, EXTENDED RELEASE ORAL at 13:48

## 2022-12-28 RX ADMIN — ASPIRIN 81 MG: 81 TABLET, COATED ORAL at 11:26

## 2022-12-28 RX ADMIN — PERFLUTREN 2 ML: 6.52 INJECTION, SUSPENSION INTRAVENOUS at 09:33

## 2022-12-28 RX ADMIN — PRASUGREL 10 MG: 10 TABLET, FILM COATED ORAL at 11:26

## 2022-12-28 RX ADMIN — Medication 10 ML: at 08:00

## 2022-12-28 RX ADMIN — SODIUM CHLORIDE, POTASSIUM CHLORIDE, SODIUM LACTATE AND CALCIUM CHLORIDE 100 ML/HR: 600; 310; 30; 20 INJECTION, SOLUTION INTRAVENOUS at 00:42

## 2022-12-28 NOTE — PROGRESS NOTES
MD ATTESTATION NOTE    The YVONNE and I have discussed this patient's history, physical exam, and treatment plan.  I have reviewed the documentation and personally had a face to face interaction with the patient. I affirm the documentation and agree with the treatment and plan.  The attached note describes my personal findings.      I provided a substantive portion of the care of the patient.  I personally performed the physical exam in its entirety, and below are my findings.  For this patient encounter, the patient wore surgical mask, I wore full protective PPE including N95 and eye protection.      Brief HPI: This patient is a 47-year-old male admitted to the observation unit yesterday following a syncopal episode while at work.  The patient reports that he began feeling lightheaded as well as a hot sensation with associated nausea and diaphoresis prior to the event.  He denied any chest pain, headache, abdominal pain, or palpitations prior to the event.  Since the incident, he has been asymptomatic and is without complaint.    PHYSICAL EXAM  ED Triage Vitals   Temp Heart Rate Resp BP SpO2   12/27/22 1110 12/27/22 1110 12/27/22 1111 12/27/22 1110 12/27/22 1110   97.8 °F (36.6 °C) 81 16 120/60 100 %      Temp src Heart Rate Source Patient Position BP Location FiO2 (%)   12/27/22 1330 12/27/22 1131 12/27/22 1131 12/27/22 1131 --   Oral Monitor Lying Right arm          GENERAL: Resting comfortably and in no acute distress, nontoxic in appearance  HENT: nares patent  EYES: no scleral icterus  CV: regular rhythm, normal rate, no M/R/G  RESPIRATORY: normal effort, lungs clear bilaterally  ABDOMEN: soft, nontender, no palpable mass  MUSCULOSKELETAL: no deformity, no edema  NEURO: alert, moves all extremities, follows commands  PSYCH:  calm, cooperative  SKIN: warm, dry    Vital signs and nursing notes reviewed.        Plan: Thus far, the patient's work-up has been unrevealing with a normal EKG, chemistries, as well as  serial cardiac enzymes.  Cardiology will see in consultation and we will obtain an echocardiogram as well as carotid ultrasound which are both still pending.  Disposition to follow.

## 2022-12-28 NOTE — DISCHARGE INSTRUCTIONS
You are being discharged with a heart monitor.  Follow instructions for wearing and returning.  Follow-up with your primary care provider and cardiologist.  Return to the emergency department with worsening symptoms, uncontrolled pain, inability to tolerate oral liquids, fever greater than 101°F not controlled by Tylenol or as needed with emergent concerns.

## 2022-12-28 NOTE — PROGRESS NOTES
ED OBSERVATION PROGRESS/DISCHARGE SUMMARY    Date of Admission: 12/27/2022   LOS: 0 days   PCP: Cristiana Enamorado PA      Subjective    No acute events overnight.    Hospital Outcome:   47-year-old male admitted to the observation unit for further evaluation after syncopal episode.  In the ER chest x-ray was negative for acute findings.  EKG revealed sinus rhythm with a rate of 70 bpm and serial troponins were negative x2.  Cardiology saw and evaluated the patient recommending further evaluation with limited echo and bilateral carotid Doppler ultrasound.  Echocardiogram shows EF 56 to 60%, normal left ventricular diastolic function, normal right ventricular cavity size and systolic function, bicuspid aortic valve cannot be excluded, aortic valve leaflets are mildly calcified.  This was a limited echocardiogram, echocardiogram from 11/24/2022 shows grossly normal aortic valve.  Duplex carotid ultrasound is negative.  Discussed these findings with patient.  Patient will be discharged home on Zio patch and follow-up with cardiology as scheduled on 1/19/2023.    Usual return to ER precautions discussed with patient who expresses understanding and is in agreement with plan.      ROS:  General: no fevers, chills  Respiratory: no cough, dyspnea  Cardiovascular: no chest pain, palpitations  Abdomen: No abdominal pain, nausea, vomiting, or diarrhea  Neurologic: No focal weakness    Objective   Physical Exam:  I have reviewed the vital signs.  Temp:  [97.3 °F (36.3 °C)-98.2 °F (36.8 °C)] 98 °F (36.7 °C)  Heart Rate:  [63-91] 63  Resp:  [16-17] 16  BP: (103-131)/(60-84) 112/65  General Appearance:    Alert, cooperative, no distress  Head:    Normocephalic, atraumatic  Eyes:    Sclerae anicteric  Neck:   Supple, no mass  Lungs: Clear to auscultation bilaterally, respirations unlabored  Heart: Regular rate and rhythm, S1 and S2 normal, no murmur, rub or gallop  Abdomen:  Soft, non-tender, bowel sounds active,  nondistended  Extremities: No clubbing, cyanosis, or edema to lower extremities  Pulses:  2+ and symmetric in distal lower extremities  Skin: No rashes   Neurologic: Oriented x3, Normal strength to extremities    Results Review:    I have reviewed the labs, radiology results and diagnostic studies.    Results from last 7 days   Lab Units 12/27/22  1133   WBC 10*3/mm3 10.73   HEMOGLOBIN g/dL 14.0   HEMATOCRIT % 42.2   PLATELETS 10*3/mm3 268     Results from last 7 days   Lab Units 12/27/22  1133   SODIUM mmol/L 137   POTASSIUM mmol/L 3.6   CHLORIDE mmol/L 106   CO2 mmol/L 22.8   BUN mg/dL 10   CREATININE mg/dL 0.81   CALCIUM mg/dL 8.7   BILIRUBIN mg/dL 0.6   ALK PHOS U/L 80   ALT (SGPT) U/L 27   AST (SGOT) U/L 18   GLUCOSE mg/dL 177*     Imaging Results (Last 24 Hours)     ** No results found for the last 24 hours. **        Echocardiogram 12/28/2022  Interpretation Summary       •  Limited echocardiogram for left ventricular function.  •  Left ventricular ejection fraction appears to be 56 - 60%.  •  Left ventricular diastolic function was normal.  •  Normal right ventricular cavity size and systolic function noted.  •  A bicuspid aortic valve cannot be excluded. The aortic valve leaflets are mildly calcified  •  There is no evidence of pericardial effusion.      Duplex carotid bilateral 12/28/2022  Interpretation Summary       •  Right internal carotid artery is normal.  •  Left internal carotid artery is normal.      I have reviewed the medications.  ---------------------------------------------------------------------------------------------  Assessment & Plan   Assessment/Problem List    Syncope      Plan:    Syncope  -No arrhythmia on EKG, electrolytes within normal limits  -Telemetry monitoring  -Consult cardiology  -Limited echo and carotid ultrasound reviewed  -ZIO at discharge  -Monitor     CAD  History of STEMI November 2022  -Continue home medications as prescribed     Diabetes  -Hemoglobin A1c  6.7  -Continue metformin  -POC glucose checks     GERD  -Continue PPI     RICKY  -Continuous pulse oximetry  -Nocturnal supplemental O2 if needed    Disposition: Home    Follow-up after Discharge: PCP, cardiology    This note will serve as discharge summary     92 minutes has been spent by [facility] providers in the care of this patient while under observation status    Ely Sanders PA-C 12/28/22 04:14 EST

## 2022-12-28 NOTE — CONSULTS
Date of Consultation: 22    Referral Provider: Dr. Kessler     Reason for Consultation: Syncope, recent acute coronary syndrome    Encounter Provider: Power Ayala MD    Group of Service: [facility]     Patient Name: Marcos Odell II    :1975    Chief complaint: Syncope    History of Present Illness:      This is a very pleasant 47 year-old male with a history of diabetes and coronary artery disease.  The patient is followed by Dr. Romero in our group.  He actually had an acute inferior STEMI on 2022.  At that time, he had a cardiac catheterization which revealed an acutely occluded distal RCA likely secondary to thrombus.  He also had a 40% discrete OM1 stenosis.  He underwent placement of a 3.5 x 23 mm Xience PRIYA to the distal RCA at that time.  His echocardiogram showed an ejection fraction of 50 to 55% afterwards.    The patient presents today after a syncopal episode.  He was working on pipes in a crawl space.  He stood up, and started walking to his truck when he got lightheaded and his vision became blurry.  He states he got to his work truck, and subsequently lost consciousness.  This was not similar to his MI.  He did have a syncopal episode at that time, although he also had severe burning left shoulder and jaw pain.  None of these symptoms were present, and he had no chest pain.  He has not had any rhythm events thus far.  His EKG does not show any new changes.  His troponin has been negative thus far.    Past Medical History:   Diagnosis Date   • GERD (gastroesophageal reflux disease)    • Sleep apnea     has CPAP, does not use         Past Surgical History:   Procedure Laterality Date   • CARDIAC CATHETERIZATION N/A 2022    Procedure: Left Heart Cath;  Surgeon: Prabhakar Romero MD;  Location: [facility] CATH INVASIVE LOCATION;  Service: Cardiovascular;  Laterality: N/A;   • CARDIAC CATHETERIZATION N/A 2022    Procedure: Percutaneous Coronary  Intervention;  Surgeon: Prabhakar Romero MD;  Location:  [facility] CATH INVASIVE LOCATION;  Service: Cardiovascular;  Laterality: N/A;   • CARDIAC CATHETERIZATION N/A 11/23/2022    Procedure: Stent PRIYA coronary;  Surgeon: Prabhakar Romero MD;  Location:  [facility] CATH INVASIVE LOCATION;  Service: Cardiovascular;  Laterality: N/A;   • CARDIAC CATHETERIZATION N/A 11/23/2022    Procedure: Coronary angiography;  Surgeon: Prabhakar Romero MD;  Location:  [facility] CATH INVASIVE LOCATION;  Service: Cardiovascular;  Laterality: N/A;   • COLONOSCOPY N/A 10/20/2022    Procedure: COLONOSCOPY TO CECUM WITH COLD POLYPECTOMIES;  Surgeon: Phillip Walker MD;  Location: [facility] ENDOSCOPY;  Service: General;  Laterality: N/A;  SCREENING  COLON POLYPS   • FOOT SURGERY Left     remove a foreign object   • TONSILLECTOMY           Allergies   Allergen Reactions   • Penicillins Other (See Comments)     childhood         No current facility-administered medications on file prior to encounter.     Current Outpatient Medications on File Prior to Encounter   Medication Sig Dispense Refill   • aspirin 81 MG EC tablet Take 1 tablet by mouth Daily. 30 tablet 11   • atorvastatin (LIPITOR) 40 MG tablet Take 1 tablet by mouth Every Night. 30 tablet 5   • losartan (COZAAR) 25 MG tablet Take 1 tablet by mouth Daily. 30 tablet 11   • metFORMIN ER (Glucophage XR) 500 MG 24 hr tablet Take 1 tablet by mouth Daily With Breakfast. 30 tablet 1   • metoprolol tartrate (LOPRESSOR) 25 MG tablet Take 1 tablet by mouth Every 12 (Twelve) Hours. 60 tablet 5   • omeprazole (priLOSEC) 20 MG capsule Take 1 capsule by mouth Daily.     • prasugrel (EFFIENT) 10 MG tablet Take 1 tablet by mouth Daily. 30 tablet 11         Social History     Socioeconomic History   • Marital status:    Tobacco Use   • Smoking status: Never   • Smokeless tobacco: Current     Types: Chew   Vaping Use   • Vaping Use: Never used   Substance and Sexual Activity   • Alcohol  use: Yes     Comment: occ   • Drug use: Never   • Sexual activity: Defer         Family History   Problem Relation Age of Onset   • Malig Hyperthermia Neg Hx        REVIEW OF SYSTEMS:   Pertinent positives were noted in the HPI above.  Otherwise, all other systems were reviewed, and are negative.     Objective:     Vitals:    12/27/22 1230 12/27/22 1231 12/27/22 1330 12/27/22 1606   BP:  128/76 131/79 122/84   BP Location:  Right arm Right arm Right arm   Patient Position:  Lying Sitting Lying   Pulse: 69 71 66 66   Resp:  17 16 16   Temp:   97.8 °F (36.6 °C) 98.2 °F (36.8 °C)   TempSrc:   Oral Oral   SpO2: 98% 98%  100%   Weight:   102 kg (225 lb)    Height:   182.9 cm (72\")      Body mass index is 30.52 kg/m².  Flowsheet Rows    Flowsheet Row First Filed Value   Admission Height 182.9 cm (72\") Documented at 12/27/2022 1131   Admission Weight 102 kg (225 lb) Documented at 12/27/2022 1131           General:    No acute distress, alert and oriented x4, pleasant                   Head:    Normocephalic, atraumatic.   Eyes:          Conjunctivae and sclerae normal, no icterus, PERRLA   Throat:   No oral lesions, no thrush, oral mucosa moist.    Neck:   Supple, trachea midline.   Lungs:     Clear to auscultation bilaterally     Heart:    Regular rhythm and normal rate.  No murmurs, gallops, or rubs noted.   Abdomen:     Soft, non-tender, non-distended, positive bowel sounds.    Extremities:   No clubbing, cyanosis, or edema.     Pulses:   Pulses palpable and equal bilaterally.    Skin:   No bleeding or rash.   Neuro:   Non-focal.  Moves all extremities well.    Psychiatric:   Normal mood and affect.         Lab Review:                Results from last 7 days   Lab Units 12/27/22  1133   SODIUM mmol/L 137   POTASSIUM mmol/L 3.6   CHLORIDE mmol/L 106   CO2 mmol/L 22.8   BUN mg/dL 10   CREATININE mg/dL 0.81   GLUCOSE mg/dL 177*   CALCIUM mg/dL 8.7     Results from last 7 days   Lab Units 12/27/22  1614 12/27/22  1304  12/27/22  1133   TROPONIN T ng/mL <0.010 <0.010 <0.010     Results from last 7 days   Lab Units 12/27/22  1133   WBC 10*3/mm3 10.73   HEMOGLOBIN g/dL 14.0   HEMATOCRIT % 42.2   PLATELETS 10*3/mm3 268                       EKG (reviewed by me personally): Normal sinus rhythm, prior inferior infarct, T wave inversions inferiorly (unchanged).      Assessment:   1.  Syncope  2.  Coronary artery disease, status post acute inferior infarct and PRIYA to the distal RCA on 11/23/2022  3.  Ejection fraction 50 to 55%  4.  Diabetes    Plan:       I really suspect that this is very likely blood pressure related.  This occurred after he stood up and started walking to his work truck.  He is also on new medications, including metoprolol after his MI.  He has not been orthostatic here, although this would be the most likely etiology.  I reassured his wife that I do not feel this is from ACS.  His symptoms are dissimilar from his MI, his EKG is unchanged, and his troponin levels are negative.  The EKG is abnormal at baseline, although it looks exactly the same as it did on 12/6/2022 in the office.    I am going to check a limited echo to ensure that his ejection fraction is still 50 to 55% and no new wall motion abnormalities have developed.  I will also check a bilateral carotid Doppler ultrasound, although I also feel this is less likely to be the culprit.  If his work-up is relatively unremarkable, I would recommend a Zio patch at discharge.  I discussed all of this in detail with the patient and his wife.    Thank you very much for this consult.    Austin Ayala MD

## 2022-12-28 NOTE — PLAN OF CARE
Goal Outcome Evaluation:     Patient being seen for a syncopal episode that could be cardiac related, patient had a STEMI in 11/2022, EF 50-55%, Cards plans to do carotid dopplers and Echo in the morning. EKG this morning displays a NSR a negative Troponin.

## 2022-12-28 NOTE — PROGRESS NOTES
HOSPITAL FOLLOW UP NOTE    Patient Name: Marcos Odell II  Patient : 1975        Date of Service:22  Provider of Service: ARJUN Jacobo  Place of Service: [facility]  Referral Provider: No ref. provider found          Follow Up: Syncope    Interval Hx: BP controlled. NSR. No complaints.       OBJECTIVE  Temp:  [97.3 °F (36.3 °C)-98.2 °F (36.8 °C)] 98 °F (36.7 °C)  Heart Rate:  [63-91] 63  Resp:  [16-17] 16  BP: (103-131)/(60-84) 112/65     Intake/Output Summary (Last 24 hours) at 2022 0749  Last data filed at 2022 1606  Gross per 24 hour   Intake 450 ml   Output --   Net 450 ml     Body mass index is 30.52 kg/m².      22  1131 22  1330   Weight: 102 kg (225 lb) 102 kg (225 lb)         Physical Exam:     General Appearance:    Alert, cooperative, in no acute distress   Head:    Normocephalic, without obvious abnormality, atraumatic   Eyes:            Conjunctivae and sclerae normal, no   icterus, no pallor, corneas clear, PERRLA   Neck:   No adenopathy, supple, trachea midline, no thyromegaly, no   carotid bruit, no JVD   Lungs:     Clear to auscultation,respirations regular, even and unlabored    Heart:    Regular rhythm and normal rate, normal S1 and S2, no murmur, no gallop, no rub, no click   Chest Wall:    No abnormalities observed   Abdomen:     Normal bowel sounds, no masses, no organomegaly, soft, nontender, nondistended, no guarding, no rebound  tenderness   Extremities:   Moves all extremities well, no edema, no cyanosis, no redness   Pulses:   Pulses palpable and equal bilaterally.          CURRENT MEDS    Scheduled Meds:aspirin, 81 mg, Oral, Daily  atorvastatin, 40 mg, Oral, Nightly  losartan, 25 mg, Oral, Q24H  metFORMIN ER, 500 mg, Oral, Daily With Breakfast  metoprolol tartrate, 25 mg, Oral, Q12H  pantoprazole, 40 mg, Oral, QAM  prasugrel, 10 mg, Oral, Daily  sodium chloride, 10 mL, Intravenous, Q12H      Continuous  Infusions:lactated ringers, 100 mL/hr, Last Rate: 100 mL/hr (12/28/22 0042)          Lab Review:   Results from last 7 days   Lab Units 12/28/22  0440 12/27/22  1133   SODIUM mmol/L 139 137   POTASSIUM mmol/L 3.9 3.6   CHLORIDE mmol/L 108* 106   CO2 mmol/L 21.9* 22.8   BUN mg/dL 7 10   CREATININE mg/dL 0.60* 0.81   GLUCOSE mg/dL 149* 177*   CALCIUM mg/dL 8.4* 8.7   AST (SGOT) U/L  --  18   ALT (SGPT) U/L  --  27         Results from last 7 days   Lab Units 12/28/22  0440 12/27/22  1133   WBC 10*3/mm3 8.04 10.73   HEMOGLOBIN g/dL 13.7 14.0   HEMATOCRIT % 39.8 42.2   PLATELETS 10*3/mm3 234 268                               ASSESSMENT & PLAN    Syncope    1.  Syncope: Likely due to new medications (metoprolol) and positional changes as patient was in crawlspace bending over and changed to a standing position.   2.  Coronary artery disease status post acute inferior infarct and PRIYA to distal RCA on 11/23/2022.  No ischemic changes on EKG.  Negative troponin. On DAPT with asa/ effient , beta blocker and statin  3.  Diabetes type 2    Bilateral carotid Doppler and limited 2D echo pending.  Okay for discharge from cardiac standpoint if both okay.  Discharge with Zio patch.     Addendum: limited echo shows no change in EF.  David carotids unremarkable.  Ok for discharge from cardiovascular standpoint with Zio patch.  Follow up as scheduled.  We will call with Booker results.         Makeda Arana, ARJUN  12/28/22

## 2022-12-28 NOTE — PLAN OF CARE
Goal Outcome Evaluation:         Pt alert and oriented x4.vss. pt states understanding of follow up appointments, and medication orders. Pt taking all belongings and leaving family vehicle w spouse.

## 2023-01-10 ENCOUNTER — TELEPHONE (OUTPATIENT)
Dept: CARDIOLOGY | Facility: CLINIC | Age: 48
End: 2023-01-10

## 2023-01-10 NOTE — TELEPHONE ENCOUNTER
Caller: STEPHANI    Relationship to patient: SELF    Best call back number: [phone]    Patient is needing: PT CALLED TO MAKE SURE WE RECEIVED THE [facility] PAPERWORK THAT HE FAXED TO US YESTERDAY. I DID NOT SEE ANYTHING IN THE CHART.     PLEASE CALL AS SOON AS YOU CAN.     THANK YOU.

## 2023-01-18 LAB
MAXIMAL PREDICTED HEART RATE: 174 BPM
STRESS TARGET HR: 148 BPM

## 2023-01-18 PROCEDURE — 93248 EXT ECG>7D<15D REV&INTERPJ: CPT | Performed by: INTERNAL MEDICINE

## 2023-01-19 ENCOUNTER — OFFICE VISIT (OUTPATIENT)
Dept: CARDIOLOGY | Facility: CLINIC | Age: 48
End: 2023-01-19
Payer: COMMERCIAL

## 2023-01-19 VITALS
HEIGHT: 72 IN | BODY MASS INDEX: 29.93 KG/M2 | SYSTOLIC BLOOD PRESSURE: 108 MMHG | WEIGHT: 221 LBS | HEART RATE: 76 BPM | DIASTOLIC BLOOD PRESSURE: 60 MMHG

## 2023-01-19 DIAGNOSIS — I25.2 HISTORY OF ACUTE INFERIOR WALL MI: Primary | ICD-10-CM

## 2023-01-19 DIAGNOSIS — E78.5 HYPERLIPIDEMIA LDL GOAL <70: ICD-10-CM

## 2023-01-19 DIAGNOSIS — E11.9 TYPE 2 DIABETES MELLITUS WITHOUT COMPLICATION, WITHOUT LONG-TERM CURRENT USE OF INSULIN: ICD-10-CM

## 2023-01-19 DIAGNOSIS — I25.10 CORONARY ARTERY DISEASE INVOLVING NATIVE CORONARY ARTERY OF NATIVE HEART WITHOUT ANGINA PECTORIS: ICD-10-CM

## 2023-01-19 PROCEDURE — 93000 ELECTROCARDIOGRAM COMPLETE: CPT | Performed by: INTERNAL MEDICINE

## 2023-01-19 PROCEDURE — 99214 OFFICE O/P EST MOD 30 MIN: CPT | Performed by: INTERNAL MEDICINE

## 2023-01-19 NOTE — PROGRESS NOTES
Date of Office Visit: 23    Encounter Provider: Prabhakar Romero MD  Place of Service: [facility] CARDIOLOGY  Patient Name: Marcos Odell II  :1975    Chief complaint  STEMI  Coronary artery disease    HPI: Marcos Odell II is a 47 y.o. male who presented to [facility] on 22 with severe central chest discomfort associated with diaphoresis and nausea.  He was noted to have inferior STEMI.  Patient was taken to the Cath Lab and found to have an acutely occluded distal RCA and subsequently underwent PCI/PRIYA to distal RCA.  He had no other obstructive coronary disease.  Creatinine stable post cath.  Patient was started on DAPT with aspirin and Effient.  Follow-up echocardiogram showed normal left ventricular systolic function and normal diastolic function with no significant valvular disease.    He is done very well since that time.  He denies any chest pain or dyspnea exertion.  He did have 1 episode of lightheadedness and with significant positional change, however has done well since that time.  Blood pressure is low normal at 108/60    Previous testing and notes have been reviewed by me.   Past Medical History:   Diagnosis Date   • GERD (gastroesophageal reflux disease)    • Sleep apnea     has CPAP, does not use       Past Surgical History:   Procedure Laterality Date   • CARDIAC CATHETERIZATION N/A 2022    Procedure: Left Heart Cath;  Surgeon: Prabhakar Romero MD;  Location: [facility] INVASIVE LOCATION;  Service: Cardiovascular;  Laterality: N/A;   • CARDIAC CATHETERIZATION N/A 2022    Procedure: Percutaneous Coronary Intervention;  Surgeon: Prabhakar Romero MD;  Location: [facility] CATH INVASIVE LOCATION;  Service: Cardiovascular;  Laterality: N/A;   • CARDIAC CATHETERIZATION N/A 2022    Procedure: Stent PRIYA coronary;  Surgeon: Prabhakar Romero MD;  Location: [facility] CATH INVASIVE LOCATION;  Service:  Cardiovascular;  Laterality: N/A;   • CARDIAC CATHETERIZATION N/A 11/23/2022    Procedure: Coronary angiography;  Surgeon: Prabhakar Romero MD;  Location: [facility] CATH INVASIVE LOCATION;  Service: Cardiovascular;  Laterality: N/A;   • COLONOSCOPY N/A 10/20/2022    Procedure: COLONOSCOPY TO CECUM WITH COLD POLYPECTOMIES;  Surgeon: Phillip Walker MD;  Location: [facility] ENDOSCOPY;  Service: General;  Laterality: N/A;  SCREENING  COLON POLYPS   • FOOT SURGERY Left     remove a foreign object   • TONSILLECTOMY         Social History     Socioeconomic History   • Marital status:    Tobacco Use   • Smoking status: Never   • Smokeless tobacco: Current     Types: Chew   Vaping Use   • Vaping Use: Never used   Substance and Sexual Activity   • Alcohol use: Yes     Comment: occ   • Drug use: Never   • Sexual activity: Defer       Family History   Problem Relation Age of Onset   • Malig Hyperthermia Neg Hx        Review of Systems   Constitutional: Negative. Negative for fever and malaise/fatigue.   HENT: Negative.  Negative for nosebleeds and sore throat.    Eyes: Negative.  Negative for blurred vision and double vision.   Cardiovascular: Negative.  Negative for chest pain, claudication, palpitations and syncope.   Respiratory: Negative.  Negative for cough, shortness of breath and snoring.    Endocrine: Negative.  Negative for cold intolerance, heat intolerance and polydipsia.   Hematologic/Lymphatic:             Skin: Negative.  Negative for itching, poor wound healing and rash.   Musculoskeletal: Negative.  Negative for joint pain, joint swelling, muscle weakness and myalgias.   Gastrointestinal: Negative.  Negative for abdominal pain, melena, nausea and vomiting.   Genitourinary: Negative.    Neurological: Negative.  Negative for light-headedness, loss of balance, seizures, vertigo and weakness.   Psychiatric/Behavioral: Negative.  Negative for altered mental status and depression.   Allergic/Immunologic:  Negative.        Allergies   Allergen Reactions   • Penicillins Other (See Comments)     childhood         Current Outpatient Medications:   •  aspirin 81 MG EC tablet, Take 1 tablet by mouth Daily., Disp: 30 tablet, Rfl: 11  •  atorvastatin (LIPITOR) 40 MG tablet, Take 1 tablet by mouth Every Night., Disp: 30 tablet, Rfl: 5  •  losartan (COZAAR) 25 MG tablet, Take 1 tablet by mouth Daily., Disp: 30 tablet, Rfl: 11  •  metFORMIN ER (Glucophage XR) 500 MG 24 hr tablet, Take 1 tablet by mouth Daily With Breakfast., Disp: 30 tablet, Rfl: 1  •  metoprolol tartrate (LOPRESSOR) 25 MG tablet, Take 1 tablet by mouth Every 12 (Twelve) Hours., Disp: 60 tablet, Rfl: 5  •  omeprazole (priLOSEC) 20 MG capsule, Take 1 capsule by mouth Daily., Disp: , Rfl:   •  prasugrel (EFFIENT) 10 MG tablet, Take 1 tablet by mouth Daily., Disp: 30 tablet, Rfl: 11      Objective:     There were no vitals filed for this visit.  There is no height or weight on file to calculate BMI.     2D Echocardiogram 11/24/2022:   •  Left ventricular ejection fraction appears to be 51 - 55%.  •  Left ventricular diastolic function was normal.  Normal RV size and systolic function  No significant valvular disease    Left Heart Cath 11/23/2022:  Left main: Large-caliber vessel bifurcates to an LAD and circumflex.  The left main coronary artery is normal  LAD: Medium caliber vessel that gives rise to a small caliber diagonal branch in the midsegment.  The LAD has luminal irregularities in the midsegment.  Left circumflex: Medium caliber vessel gives rise to a medium caliber OM1 and small caliber OM 2.  The first obtuse marginal branch has a discrete 40% ostial stenosis  RCA: Large-caliber, dominant vessel that gives rise to a medium caliber PDA and 2 small caliber RPL branches.  Acutely occluded in the distal segment.    Left ventricular angiography: Normal left ventricular size and systolic function.  Basal to mid inferior wall dyskinesis.  Ejection fraction  55%    Intervention: PCI with easement of 3.5 x 23 mm Xience Skypoint drug-eluting stent across the distal RCA.  HA III flow with no complications at completion.     PHYSICAL EXAM:    Constitutional:       Appearance: Healthy appearance. Not in distress.   Neck:      Vascular: No JVR. JVD normal.   Pulmonary:      Effort: Pulmonary effort is normal.      Breath sounds: Normal breath sounds. No wheezing. No rhonchi. No rales.   Chest:      Chest wall: Not tender to palpatation.   Cardiovascular:      PMI at left midclavicular line. Normal rate. Regular rhythm. Normal S1. Normal S2.      Murmurs: There is no murmur.      No gallop. No click. No rub.   Pulses:     Intact distal pulses.   Edema:     Peripheral edema absent.   Abdominal:      General: Bowel sounds are normal.      Palpations: Abdomen is soft.      Tenderness: There is no abdominal tenderness.   Musculoskeletal: Normal range of motion.         General: No tenderness. Skin:     General: Skin is warm and dry.   Neurological:      General: No focal deficit present.      Mental Status: Alert and oriented to person, place and time.           ECG 12 Lead    Date/Time: 1/19/2023 3:18 PM  Performed by: Prabhakar Romero MD  Authorized by: Prabhakar Romero MD   Comparison: compared with previous ECG from 12/28/2022  Similar to previous ECG  Rhythm: sinus rhythm  Rate: normal  QRS axis: normal    Clinical impression: abnormal EKG  Comments: Inferior infarction             12/28/22  •  Left ventricular ejection fraction appears to be 56 - 60%.  •  Left ventricular diastolic function was normal.  •  Normal right ventricular cavity size and systolic function noted.  •  A bicuspid aortic valve cannot be excluded. The aortic valve leaflets are mildly calcified  •  There is no evidence of pericardial effusion.     11/23/22  Conclusions:   1. Left main: Normal  2. LAD: Luminal irregularities  3. LCX: Discrete 40% OM1 stenosis  4. RCA: Acutely occluded distal  segment  5.  Normal left ventricular size and systolic function.  Base to mid inferior wall dyskinesis.  6.  Successful PCI of the distal RCA with a 3.5 x 23 mm Xience shayna point drug-eluting stent, postdilated to high-pressure with a 4.0 x 15 mm NC trek balloon        Assessment:        Plan:       1.  Coronary artery disease with history of inferior ST elevation MI   -Currently is chest pain-free and feels well  - Continue aspirin 81 mg p.o. daily lifelong.  - Continue Effient for 1 year.  - Continue Lipitor 40 mg p.o. nightly along with metoprolol tartrate 25 mg p.o. every 12 hours.  No fatigue reported.  Also on ARB    2.  Hyperlipidemia: Recently placed on statin in November 2022.  Continue atorvastatin 40 mg p.o. nightly.  No myalgias.  Follow-up lipid panel in 6 months    3.  Diabetes mellitus type 2: Hemoglobin A1c 6.7.  Continuing metformin.  Additional management per primary team.  Secondary to diabetes mellitus I would recommend leaving the patient on ARB              Your medication list          Accurate as of January 19, 2023  2:42 PM. If you have any questions, ask your nurse or doctor.            CONTINUE taking these medications      Instructions Last Dose Given Next Dose Due   aspirin 81 MG EC tablet      Take 1 tablet by mouth Daily.       atorvastatin 40 MG tablet  Commonly known as: LIPITOR      Take 1 tablet by mouth Every Night.       losartan 25 MG tablet  Commonly known as: COZAAR      Take 1 tablet by mouth Daily.       metFORMIN  MG 24 hr tablet  Commonly known as: Glucophage XR      Take 1 tablet by mouth Daily With Breakfast.       metoprolol tartrate 25 MG tablet  Commonly known as: LOPRESSOR      Take 1 tablet by mouth Every 12 (Twelve) Hours.       omeprazole 20 MG capsule  Commonly known as: priLOSEC      Take 1 capsule by mouth Daily.       prasugrel 10 MG tablet  Commonly known as: EFFIENT      Take 1 tablet by mouth Daily.

## 2023-01-23 RX ORDER — METFORMIN HYDROCHLORIDE 500 MG/1
TABLET, EXTENDED RELEASE ORAL
Qty: 30 TABLET | Refills: 1 | OUTPATIENT
Start: 2023-01-23

## 2023-01-24 ENCOUNTER — TELEPHONE (OUTPATIENT)
Dept: CARDIOLOGY | Facility: CLINIC | Age: 48
End: 2023-01-24
Payer: COMMERCIAL

## 2023-01-24 NOTE — TELEPHONE ENCOUNTER
Caller: Marcos Odell II    Relationship: Self    Best call back number: [phone]    What is the best time to reach you: ANYTIME    Who are you requesting to speak with (clinical staff, provider,  specific staff member): STEPHANE    Do you know the name of the person who called: STEPHANE    What was the call regarding: PT AND WIFE REACHING OUT TO GIVE RETURN TO WORK DATE TO STEPHANE WHO WAS REQUESTING IT FOR FMLA PAPERWORK -PT REPORTS THE HE HAS TWO DATES BECAUSE HE HAS TWO CLAIMS ACTIVE - 12.05.22 WAS THE FIRST DATE THE SECOND DATE IS 01.03.23 -     Do you require a callback?: IF THERE IS MORE INFORMATION NEEDED

## 2023-01-25 NOTE — TELEPHONE ENCOUNTER
Called and spoke with pt's wife. I faxed it earlier today. I told her to call if there are any issues with the paperwork.    Izabela

## 2023-02-02 ENCOUNTER — TELEPHONE (OUTPATIENT)
Dept: CARDIOLOGY | Facility: CLINIC | Age: 48
End: 2023-02-02
Payer: COMMERCIAL

## 2023-02-02 NOTE — TELEPHONE ENCOUNTER
Caller: KENNEDY JUDD    Relationship: Emergency Contact    What is the best NUMBER AND time to reach you: PT ANYTIME @ [phone]    KENNEDY @ [phone] AFTER 1400    Who are you requesting to speak with (clinical staff, provider,  specific staff member): STEPHANE DAVIS    What was the call regarding: PT WIFE IS FOLLOWING UP ON FMLA PAPERWORK AS THEY TOLD HER THAT THE PAPERWORK WAS INCOMPLETE AND NEEDS FINISHED BEFORE THEY CAN APPROVE PATIENT - MARKING URGENT AS PT WIFE REPORTS SHE HAS LEFT VM AND NOT HEARD BACK FROM OFFICE    Do you require a callback: YES PLEASE

## 2023-02-03 NOTE — TELEPHONE ENCOUNTER
S/saleem Astorga. I need to revise section D of his [facility] paperwork and refax.    Izabela

## 2023-02-09 NOTE — TELEPHONE ENCOUNTER
Caller: MIRNA  Relationship: Self    Best call back number: [phone]  Requested Prescriptions:   Requested Prescriptions     Pending Prescriptions Disp Refills   • metFORMIN ER (Glucophage XR) 500 MG 24 hr tablet 30 tablet 1     Sig: Take 1 tablet by mouth Daily With Breakfast.        Pharmacy where request should be sent: [facility] [address] [place] [facility] [phone] [facility] [phone] FX     Additional details provided by patient:     Does the patient have less than a 3 day supply:  [x] Yes  [] No    Would you like a call back once the refill request has been completed: [x] Yes [] No    If the office needs to give you a call back, can they leave a voicemail: [x] Yes [] No    Jacques Ayala Rep   02/09/23 15:24 EST

## 2023-02-10 RX ORDER — METFORMIN HYDROCHLORIDE 500 MG/1
500 TABLET, EXTENDED RELEASE ORAL
Qty: 30 TABLET | Refills: 1 | OUTPATIENT
Start: 2023-02-10

## 2023-05-23 RX ORDER — ATORVASTATIN CALCIUM 40 MG/1
TABLET, FILM COATED ORAL
Qty: 30 TABLET | Refills: 3 | Status: SHIPPED | OUTPATIENT
Start: 2023-05-23

## 2024-01-08 ENCOUNTER — TELEPHONE (OUTPATIENT)
Dept: CARDIOLOGY | Facility: CLINIC | Age: 49
End: 2024-01-08
Payer: COMMERCIAL

## 2024-01-08 NOTE — TELEPHONE ENCOUNTER
[facility] received a fax that requires your attention. The document has been indexed to the patient’s chart for your review.      Reason for sending: EXTERNAL MEDICAL RECORD NOTIFICATION     Documents Description: [facility] ORD-[facility] CARD[facility] REQ-1.8.24    Name of Sender: [facility]     Date Indexed: 1.8.24

## 2024-01-10 NOTE — TELEPHONE ENCOUNTER
Pt is needing clearance for a colonoscopy and banding with Dr. Spencer Diaz. It has not been scheduled yet. They will also need to know how long the pt can hold his Effient.    We saw him last on 7/19/23  Please advise.    Thanks,  Izabela

## 2024-01-10 NOTE — TELEPHONE ENCOUNTER
Called and spoke with his wife and advised about stopping the Effient and the clearance instructions.    I faxed his surgery clearance letter through Epic. And I updated his med list.    Izabela

## 2024-01-17 RX ORDER — PRASUGREL 10 MG/1
10 TABLET, FILM COATED ORAL DAILY
Qty: 30 TABLET | Refills: 0 | OUTPATIENT
Start: 2024-01-17

## 2024-01-25 ENCOUNTER — OFFICE VISIT (OUTPATIENT)
Age: 49
End: 2024-01-25
Payer: COMMERCIAL

## 2024-01-25 VITALS
HEART RATE: 71 BPM | WEIGHT: 225 LBS | DIASTOLIC BLOOD PRESSURE: 80 MMHG | HEIGHT: 72 IN | SYSTOLIC BLOOD PRESSURE: 122 MMHG | BODY MASS INDEX: 30.48 KG/M2

## 2024-01-25 DIAGNOSIS — E11.9 TYPE 2 DIABETES MELLITUS WITHOUT COMPLICATION, WITHOUT LONG-TERM CURRENT USE OF INSULIN: ICD-10-CM

## 2024-01-25 DIAGNOSIS — Z95.5 S/P PRIMARY ANGIOPLASTY WITH CORONARY STENT: ICD-10-CM

## 2024-01-25 DIAGNOSIS — E78.5 HYPERLIPIDEMIA LDL GOAL <70: ICD-10-CM

## 2024-01-25 DIAGNOSIS — I25.2 HISTORY OF ACUTE INFERIOR WALL MI: Primary | ICD-10-CM

## 2024-01-25 NOTE — PROGRESS NOTES
Date of Office Visit: 24    Encounter Provider: Prabhakar Romero MD  Place of Service: [facility] CARDIOLOGY  Patient Name: Marcos Odell II  :1975    Chief complaint  STEMI  Coronary artery disease    HPI: Marcos Odell II is a 48 y.o. male who presented to [facility] on 22 with severe central chest discomfort associated with diaphoresis and nausea.  He was noted to have inferior STEMI.  Patient was taken to the Cath Lab and found to have an acutely occluded distal RCA and subsequently underwent PCI/PRIYA to distal RCA.  He had no other obstructive coronary disease.  Creatinine stable post cath.  Patient was started on DAPT with aspirin and Effient.  Follow-up echocardiogram showed normal left ventricular systolic function and normal diastolic function with no significant valvular disease.  Since her last visit has been doing great.  He denies any chest pain or dyspnea on exertion.  He recently stopped his Effient therapy with our direction.  Blood pressure and heart rate are well-controlled.    Previous testing and notes have been reviewed by me.     Past Medical History:   Diagnosis Date    GERD (gastroesophageal reflux disease)     Sleep apnea     has CPAP, does not use       Past Surgical History:   Procedure Laterality Date    CARDIAC CATHETERIZATION N/A 2022    Procedure: Left Heart Cath;  Surgeon: Prabhakar Romero MD;  Location: [facility] INVASIVE LOCATION;  Service: Cardiovascular;  Laterality: N/A;    CARDIAC CATHETERIZATION N/A 2022    Procedure: Percutaneous Coronary Intervention;  Surgeon: Prabhakar Romero MD;  Location: [facility] CATH INVASIVE LOCATION;  Service: Cardiovascular;  Laterality: N/A;    CARDIAC CATHETERIZATION N/A 2022    Procedure: Stent PRIYA coronary;  Surgeon: Prabhakar Romero MD;  Location: [facility] CATH INVASIVE LOCATION;  Service: Cardiovascular;  Laterality: N/A;    CARDIAC  CATHETERIZATION N/A 11/23/2022    Procedure: Coronary angiography;  Surgeon: Prabhakar Romero MD;  Location: [facility] CATH INVASIVE LOCATION;  Service: Cardiovascular;  Laterality: N/A;    COLONOSCOPY N/A 10/20/2022    Procedure: COLONOSCOPY TO CECUM WITH COLD POLYPECTOMIES;  Surgeon: Phillip Walker MD;  Location: [facility] ENDOSCOPY;  Service: General;  Laterality: N/A;  SCREENING  COLON POLYPS    FOOT SURGERY Left     remove a foreign object    TONSILLECTOMY         Social History     Socioeconomic History    Marital status:    Tobacco Use    Smoking status: Never    Smokeless tobacco: Current     Types: Chew   Vaping Use    Vaping Use: Never used   Substance and Sexual Activity    Alcohol use: Yes     Comment: occ    Drug use: Never    Sexual activity: Defer       Family History   Problem Relation Age of Onset    No Known Problems Mother     No Known Problems Father     Malig Hyperthermia Neg Hx        Review of Systems   Constitutional: Negative. Negative for fever and malaise/fatigue.   HENT: Negative.  Negative for nosebleeds and sore throat.    Eyes: Negative.  Negative for blurred vision and double vision.   Cardiovascular: Negative.  Negative for chest pain, claudication, palpitations and syncope.   Respiratory: Negative.  Negative for cough, shortness of breath and snoring.    Endocrine: Negative.  Negative for cold intolerance, heat intolerance and polydipsia.   Hematologic/Lymphatic:             Skin: Negative.  Negative for itching, poor wound healing and rash.   Musculoskeletal: Negative.  Negative for joint pain, joint swelling, muscle weakness and myalgias.   Gastrointestinal: Negative.  Negative for abdominal pain, melena, nausea and vomiting.   Genitourinary: Negative.    Neurological: Negative.  Negative for light-headedness, loss of balance, seizures, vertigo and weakness.   Psychiatric/Behavioral: Negative.  Negative for altered mental status and depression.    Allergic/Immunologic:  "Negative.        Allergies   Allergen Reactions    Penicillins Other (See Comments)     childhood         Current Outpatient Medications:     aspirin 81 MG EC tablet, Take 1 tablet by mouth Daily., Disp: 30 tablet, Rfl: 11    atorvastatin (LIPITOR) 40 MG tablet, Take 1 tablet by mouth every night at bedtime., Disp: 90 tablet, Rfl: 3    buPROPion SR (WELLBUTRIN SR) 150 MG 12 hr tablet, Take 1 tablet by mouth Daily., Disp: , Rfl:     losartan (COZAAR) 25 MG tablet, Take 1 tablet by mouth Daily., Disp: 90 tablet, Rfl: 3    metFORMIN ER (Glucophage XR) 500 MG 24 hr tablet, Take 1 tablet by mouth Daily With Breakfast., Disp: 30 tablet, Rfl: 1    metoprolol tartrate (LOPRESSOR) 25 MG tablet, Take 1 tablet by mouth Every 12 (Twelve) Hours., Disp: 180 tablet, Rfl: 3    omeprazole (priLOSEC) 20 MG capsule, Take 1 capsule by mouth Daily., Disp: , Rfl:       Objective:     Vitals:    01/25/24 1514   BP: 122/80   Pulse: 71   Weight: 102 kg (225 lb)   Height: 182.9 cm (72\")     Body mass index is 30.52 kg/m².     PHYSICAL EXAM:    Constitutional:       Appearance: Healthy appearance. Not in distress.   Neck:      Vascular: No JVR. JVD normal.   Pulmonary:      Effort: Pulmonary effort is normal.      Breath sounds: Normal breath sounds. No wheezing. No rhonchi. No rales.   Chest:      Chest wall: Not tender to palpatation.   Cardiovascular:      PMI at left midclavicular line. Normal rate. Regular rhythm. Normal S1. Normal S2.       Murmurs: There is no murmur.      No gallop.  No click. No rub.   Pulses:     Intact distal pulses.   Edema:     Peripheral edema absent.   Abdominal:      General: Bowel sounds are normal.      Palpations: Abdomen is soft.      Tenderness: There is no abdominal tenderness.   Musculoskeletal: Normal range of motion.         General: No tenderness. Skin:     General: Skin is warm and dry.   Neurological:      General: No focal deficit present.      Mental Status: Alert and oriented to person, place " and time.           ECG 12 Lead    Date/Time: 1/25/2024 3:21 PM  Performed by: Prabhakar Romero MD    Authorized by: Prabhakar Romero MD  Comparison: compared with previous ECG from 7/19/2023  Similar to previous ECG  Rhythm: sinus rhythm  Rate: normal  QRS axis: normal  Comments: Inferior infarct           12/28/22    Left ventricular ejection fraction appears to be 56 - 60%.    Left ventricular diastolic function was normal.    Normal right ventricular cavity size and systolic function noted.    A bicuspid aortic valve cannot be excluded. The aortic valve leaflets are mildly calcified    There is no evidence of pericardial effusion.     11/23/22  Conclusions:   1. Left main: Normal  2. LAD: Luminal irregularities  3. LCX: Discrete 40% OM1 stenosis  4. RCA: Acutely occluded distal segment  5.  Normal left ventricular size and systolic function.  Base to mid inferior wall dyskinesis.  6.  Successful PCI of the distal RCA with a 3.5 x 23 mm Xience shayna point drug-eluting stent, postdilated to high-pressure with a 4.0 x 15 mm NC trek balloon        Assessment:        Plan:       1.  Coronary artery disease with history of inferior ST elevation MI   -Currently is chest pain-free and feels well  - Continue aspirin 81 mg p.o. daily lifelong.  - Continue Lipitor 40 mg p.o. nightly along with metoprolol tartrate 25 mg p.o. every 12 hours.  No fatigue reported.  Also on ARB  -Labs from last year reviewed.  Plan on repeat labs next few months with his primary care    2.  Hyperlipidemia: LDL at goal on last year's labs.  Repeat labs this year pending.    3.  Diabetes mellitus type 2:  Continuing metformin.  Additional management per primary team.             Your medication list            Accurate as of January 25, 2024  3:18 PM. If you have any questions, ask your nurse or doctor.                CONTINUE taking these medications        Instructions Last Dose Given Next Dose Due   aspirin 81 MG EC tablet      Take  1 tablet by mouth Daily.       atorvastatin 40 MG tablet  Commonly known as: LIPITOR      Take 1 tablet by mouth every night at bedtime.       buPROPion  MG 12 hr tablet  Commonly known as: WELLBUTRIN SR      Take 1 tablet by mouth Daily.       losartan 25 MG tablet  Commonly known as: COZAAR      Take 1 tablet by mouth Daily.       metFORMIN  MG 24 hr tablet  Commonly known as: Glucophage XR      Take 1 tablet by mouth Daily With Breakfast.       metoprolol tartrate 25 MG tablet  Commonly known as: LOPRESSOR      Take 1 tablet by mouth Every 12 (Twelve) Hours.       omeprazole 20 MG capsule  Commonly known as: priLOSEC      Take 1 capsule by mouth Daily.

## 2024-06-27 RX ORDER — ATORVASTATIN CALCIUM 40 MG/1
40 TABLET, FILM COATED ORAL
Qty: 90 TABLET | Refills: 1 | Status: SHIPPED | OUTPATIENT
Start: 2024-06-27

## 2024-06-27 RX ORDER — LOSARTAN POTASSIUM 25 MG/1
25 TABLET ORAL
Qty: 90 TABLET | Refills: 1 | Status: SHIPPED | OUTPATIENT
Start: 2024-06-27

## 2024-12-09 RX ORDER — ATORVASTATIN CALCIUM 40 MG/1
40 TABLET, FILM COATED ORAL
Qty: 90 TABLET | Refills: 0 | Status: SHIPPED | OUTPATIENT
Start: 2024-12-09

## 2024-12-09 RX ORDER — LOSARTAN POTASSIUM 25 MG/1
25 TABLET ORAL
Qty: 90 TABLET | Refills: 0 | Status: SHIPPED | OUTPATIENT
Start: 2024-12-09

## 2024-12-23 RX ORDER — METFORMIN HYDROCHLORIDE 500 MG/1
500 TABLET, EXTENDED RELEASE ORAL
Qty: 30 TABLET | Refills: 1 | OUTPATIENT
Start: 2024-12-23

## 2025-01-10 RX ORDER — METFORMIN HYDROCHLORIDE 500 MG/1
500 TABLET, EXTENDED RELEASE ORAL
Qty: 90 TABLET | Refills: 1 | Status: SHIPPED | OUTPATIENT
Start: 2025-01-10

## 2025-01-31 ENCOUNTER — TELEPHONE (OUTPATIENT)
Dept: CARDIOLOGY | Facility: CLINIC | Age: 50
End: 2025-01-31

## 2025-01-31 ENCOUNTER — OFFICE VISIT (OUTPATIENT)
Dept: CARDIOLOGY | Facility: CLINIC | Age: 50
End: 2025-01-31
Payer: COMMERCIAL

## 2025-01-31 VITALS
WEIGHT: 234.6 LBS | SYSTOLIC BLOOD PRESSURE: 122 MMHG | HEIGHT: 72 IN | OXYGEN SATURATION: 97 % | HEART RATE: 61 BPM | DIASTOLIC BLOOD PRESSURE: 82 MMHG | BODY MASS INDEX: 31.77 KG/M2

## 2025-01-31 DIAGNOSIS — R00.0 RACING HEART BEAT: ICD-10-CM

## 2025-01-31 DIAGNOSIS — I25.10 CORONARY ARTERY DISEASE INVOLVING NATIVE CORONARY ARTERY OF NATIVE HEART WITHOUT ANGINA PECTORIS: Primary | ICD-10-CM

## 2025-01-31 DIAGNOSIS — E78.2 MIXED HYPERLIPIDEMIA: ICD-10-CM

## 2025-01-31 DIAGNOSIS — R94.31 ABNORMAL EKG: ICD-10-CM

## 2025-01-31 DIAGNOSIS — E11.9 TYPE 2 DIABETES MELLITUS WITHOUT COMPLICATION, WITHOUT LONG-TERM CURRENT USE OF INSULIN: ICD-10-CM

## 2025-01-31 DIAGNOSIS — G47.33 OSA (OBSTRUCTIVE SLEEP APNEA): ICD-10-CM

## 2025-01-31 DIAGNOSIS — Z72.0 TOBACCO USE: ICD-10-CM

## 2025-01-31 PROBLEM — Z12.11 SCREEN FOR COLON CANCER: Status: RESOLVED | Noted: 2022-08-24 | Resolved: 2025-01-31

## 2025-01-31 PROBLEM — E78.5 HYPERLIPIDEMIA LDL GOAL <70: Status: RESOLVED | Noted: 2022-12-06 | Resolved: 2025-01-31

## 2025-01-31 PROBLEM — Z95.5 S/P PRIMARY ANGIOPLASTY WITH CORONARY STENT: Status: RESOLVED | Noted: 2022-12-06 | Resolved: 2025-01-31

## 2025-01-31 PROBLEM — I25.2 HISTORY OF ACUTE INFERIOR WALL MI: Status: RESOLVED | Noted: 2022-12-06 | Resolved: 2025-01-31

## 2025-01-31 PROBLEM — I21.19 ACUTE ST ELEVATION MYOCARDIAL INFARCTION (STEMI) OF INFERIOR WALL: Status: RESOLVED | Noted: 2022-11-23 | Resolved: 2025-01-31

## 2025-01-31 PROBLEM — R55 SYNCOPE: Status: RESOLVED | Noted: 2022-12-27 | Resolved: 2025-01-31

## 2025-01-31 RX ORDER — METOPROLOL TARTRATE 25 MG/1
37.5 TABLET, FILM COATED ORAL EVERY 12 HOURS
Start: 2025-01-31

## 2025-01-31 NOTE — PROGRESS NOTES
"  Date of Office Visit: 2025  Encounter Provider: ARJUN Medina  Place of Service: [facility] CARDIOLOGY  Patient Name: Marcos Odell II  :1975  Primary Cardiologist: Dr. Rigoberto Romero    Chief Complaint   Patient presents with    Coronary Artery Disease    Annual Exam   :     HPI: Marcos Odell II is a 49 y.o. male who presents today for annual cardiac follow-up visit.  He is a new patient to me and I have reviewed his medical records.    He has known hyperlipidemia, type 2 diabetes mellitus, and obstructive sleep apnea (does not use CPAP).  He is not sure if he has been diagnosed with hypertension.  He has a history of cigarette and cigar smoking.  He currently chews tobacco.    In 2022, he presented to the [facility] ED with severe midsternal chest pain, diaphoresis, and nausea.  He was diagnosed with an inferior STEMI and underwent PCI/PRIYA to distal RCA.  Follow-up echocardiogram showed normal LVEF.    He presents today with his wife accompanying him.  Occasionally, he feels his heart racing.  He says he drinks a lot of caffeine and still chews tobacco.  He remains active at work.  He denies chest pain, shortness of air, PND, orthopnea, edema, dizziness, syncope, or bleeding.  His wife says he has been more fatigued recently.  He does have blood work completed at his PCP office and he says his hemoglobin A1c is \"through the roof\".  Blood pressure and heart rate are normal.  I have requested his last blood work from his PCP office.      Past Medical History:   Diagnosis Date    Coronary artery disease     Status post PCI/PRIYA to distal RCA    GERD (gastroesophageal reflux disease)     Hyperlipidemia     RICKY (obstructive sleep apnea)     has CPAP, does not use    ST elevation myocardial infarction (STEMI) of inferior wall 2022    Syncope 2022    Tobacco use 2022    Type 2 diabetes mellitus        Past Surgical History:   Procedure " Laterality Date    CARDIAC CATHETERIZATION N/A 11/23/2022    Procedure: Left Heart Cath;  Surgeon: Prabhakar Romero MD;  Location:  [facility];  Service: Cardiovascular;  Laterality: N/A;    CARDIAC CATHETERIZATION N/A 11/23/2022    Procedure: Percutaneous Coronary Intervention;  Surgeon: Prabhakar Romero MD;  Location:  [facility];  Service: Cardiovascular;  Laterality: N/A;    CARDIAC CATHETERIZATION N/A 11/23/2022    Procedure: Stent PRIYA coronary;  Surgeon: Prabhakar Romero MD;  Location:  [facility];  Service: Cardiovascular;  Laterality: N/A;    CARDIAC CATHETERIZATION N/A 11/23/2022    Procedure: Coronary angiography;  Surgeon: Prabhakar Romero MD;  Location:  [facility];  Service: Cardiovascular;  Laterality: N/A;    COLONOSCOPY N/A 10/20/2022    Procedure: COLONOSCOPY TO CECUM WITH COLD POLYPECTOMIES;  Surgeon: Phillip Walker MD;  Location: [facility] ENDOSCOPY;  Service: General;  Laterality: N/A;  SCREENING  COLON POLYPS    FOOT SURGERY Left     remove a foreign object    TONSILLECTOMY         Social History     Socioeconomic History    Marital status:    Tobacco Use    Smoking status: Former     Types: Cigarettes, Cigars    Smokeless tobacco: Current     Types: Chew   Vaping Use    Vaping status: Never Used   Substance and Sexual Activity    Alcohol use: Yes     Comment: occ    Drug use: Never    Sexual activity: Defer       Family History   Problem Relation Age of Onset    No Known Problems Mother     No Known Problems Father     Malig Hyperthermia Neg Hx        The following portion of the patient's history were reviewed and updated as appropriate: past medical history, past surgical history, past social history, past family history, allergies, current medications, and problem list.    Review of Systems   Constitutional: Positive for malaise/fatigue and weight gain.   Cardiovascular:  Positive for  "palpitations.   Respiratory:  Positive for snoring.    Hematologic/Lymphatic: Negative.    Neurological:  Positive for excessive daytime sleepiness.       Allergies   Allergen Reactions    Penicillins Other (See Comments)     childhood         Current Outpatient Medications:     aspirin 81 MG EC tablet, Take 1 tablet by mouth Daily., Disp: 30 tablet, Rfl: 11    atorvastatin (LIPITOR) 40 MG tablet, Take 1 tablet by mouth every night at bedtime. NEEDS CHOLESTEROL LABS, Disp: 90 tablet, Rfl: 0    buPROPion SR (WELLBUTRIN SR) 150 MG 12 hr tablet, Take 1 tablet by mouth Daily., Disp: , Rfl:     losartan (COZAAR) 25 MG tablet, Take 1 tablet by mouth Daily. NEEDS LABS, Disp: 90 tablet, Rfl: 0    metFORMIN ER (Glucophage XR) 500 MG 24 hr tablet, Take 1 tablet by mouth Daily With Breakfast., Disp: 90 tablet, Rfl: 1    metoprolol tartrate (LOPRESSOR) 25 MG tablet, Take 1.5 tablets by mouth Every 12 (Twelve) Hours., Disp: , Rfl:     omeprazole (priLOSEC) 20 MG capsule, Take 1 capsule by mouth Daily., Disp: , Rfl:          Objective:     Vitals:    01/31/25 0951   BP: 122/82   BP Location: Left arm   Patient Position: Sitting   Cuff Size: Adult   Pulse: 61   SpO2: 97%   Weight: 106 kg (234 lb 9.6 oz)   Height: 182.9 cm (72.01\")     Body mass index is 31.81 kg/m².    PHYSICAL EXAM:    Vitals Reviewed.   General Appearance: No acute distress, well developed and well nourished. Obese.   HENT: No hearing loss noted.    Respiratory: No signs of respiratory distress. Respiration rhythm and depth normal.  Clear to auscultation.   Cardiovascular:  Jugular Venous Pressure: Normal  Heart Rate and Rhythm: Normal, Heart Sounds: Normal S1 and S2. No S3 or S4 noted.  Murmurs: No murmurs noted. No rubs, thrills, or gallops.   Lower Extremities: No edema noted.  Musculoskeletal: Normal movement of extremities.  Skin: General appearance normal.    Psychiatric: Patient alert and oriented to person, place, and time. Speech and behavior " appropriate. Normal mood and affect.       ECG 12 Lead    Date/Time: 1/31/2025 9:56 AM  Performed by: Jigna Elena APRN    Authorized by: Jigna Elena APRN  Comparison: compared with previous ECG from 1/25/2024  Similar to previous ECG  Rhythm: sinus rhythm  Rate: normal  BPM: 61  Q waves: II, III and aVF    ST Segments: ST segments normal  T inversion: III and aVF  QRS axis: normal  Other findings: T wave abnormality    Clinical impression: abnormal EKG            Assessment:       Diagnosis Plan   1. Coronary artery disease involving native coronary artery of native heart without angina pectoris        2. Abnormal EKG        3. Racing heart beat        4. Type 2 diabetes mellitus without complication, without long-term current use of insulin        5. Mixed hyperlipidemia        6. RICKY (obstructive sleep apnea)        7. Tobacco use               Plan:       1/2.  Coronary Artery Disease: History of inferior STEMI and drug-eluting stent placement to the RCA.  He has a chronically abnormal EKG.  Denies angina.  Risk factor modification discussed.  Continue aspirin and atorvastatin.    3.  Occasionally, his heart races.  He says he drinks a lot of coffee and chews tobacco.  Explained how both of those can cause heart racing.  Increase metoprolol to 37.5 mg twice per day.  I will check on him in a month.    4.  Type 2 diabetes mellitus: He said his hemoglobin A1c was elevated.    5.  Hyperlipidemia: On atorvastatin.    6.  Obstructive sleep apnea: He reports fatigue, but does not use his CPAP.    7.  He would highly benefit from abstaining from all nicotine products.  Cessation recommended.    8.  I have requested last blood work from his PCP office.  I will check on him via phone in 1 month.  Call with cardiac concerns.  Follow-up Dr. Romero in 1 year.    As always, it has been a pleasure to participate in your patient's care. Thank you.         Sincerely,         ARJUN Ferro  [facility]  [facility]      Dictated utilizing Dragon Dictation

## 2025-03-05 ENCOUNTER — TELEPHONE (OUTPATIENT)
Dept: CARDIOLOGY | Facility: CLINIC | Age: 50
End: 2025-03-05
Payer: COMMERCIAL

## 2025-03-05 NOTE — TELEPHONE ENCOUNTER
In January 2025, I saw him in the office and reported heart racing.  He was drinking a lot of caffeine and chewing tobacco in which I recommended that he decrease or abstain from both.  I increased his metoprolol to 37.5 mg twice per day.    Please call to reassess his heart racing and home blood pressure and heart rates.  I tried requesting his blood work from his PCP office and it has not been done.  I have placed orders for him to have blood work done in the near future and he needs to have this done for refills.  Thank you

## 2025-03-05 NOTE — TELEPHONE ENCOUNTER
I called and s/w pt's wife, Gauri (okay per [facility]).  She says pt gets off work at 14:00 and she'll ask him to call us at that time.  She notes he's stopped using tobacco and has reduced his caffeine intake.    I gave her instructions on obtaining labs.  It sounds like they plan to go to the [facility] in [place] since this is closer to them.    Patti MELTON RN  Triage [facility]  03/05/25 11:36 EST

## 2025-03-06 ENCOUNTER — TELEPHONE (OUTPATIENT)
Dept: CARDIOLOGY | Facility: CLINIC | Age: 50
End: 2025-03-06
Payer: COMMERCIAL

## 2025-03-06 NOTE — TELEPHONE ENCOUNTER
Caller: KENNEDY JUDD    Relationship to patient: Emergency Contact    Best call back number: [phone]     Patient is needing: THEY ARE NEEDING AN ORDER FOR BLOODWORK TO BE SENT OVER TO PT'S PCP OFFICE. PT SEE'S [facility]. KENNEDY DID NOT HAVE FAX #. CAN SOMEONE ASSIST W/ THIS? THANK YOU ALL.

## 2025-03-06 NOTE — TELEPHONE ENCOUNTER
I waited until after 14:00 today to discuss BP log with pt.  Pt is at the bank right now for a meeting.    I asked him to call the office back when he's available to review his BP log.  We briefly discussed his need for labs.  I explained that we would need labs drawn in order to continue filling his meds and that per his PCP, pt hasn't had labs done since June 2023.    Patti MELTON RN  Triage [facility] [place]  03/06/25 14:29 EST

## 2025-03-07 NOTE — TELEPHONE ENCOUNTER
Patient returned call.  He reports he feels good. His BP is running low 120s/upper 70s to low 80s.  HR has not been paying attention to his HR but he no longer has heart racing.  I did advise he periodically keep an eye on his HR and call the office if it is consistently <50 or >100.      He is due for labs at his PCP.  I let him know some PCP's will not draw outside labs.  I let him know the the names of the labs we need and that he needs to be fasting.  I will also fax the others to [facility].  If for some reason they cannot draw the labs I have asked the patient to go to a [facility] outpatient lab and have them done.    He verbalizes understanding of all and agrees with plan.    Emely Garcia RN  [facility] Triage  03/07/25 10:48 EST

## 2025-03-07 NOTE — TELEPHONE ENCOUNTER
Noted.  I am glad he is feeling better.  Continue metoprolol tartrate 37.5 mg twice per day.  He will need to call us when the blood work is completed so that we can obtain it from his PCP office.  Call with any concerns.  Follow-up with Dr. Romero as scheduled.  Thank you

## 2025-03-07 NOTE — TELEPHONE ENCOUNTER
This has been taken care of.  See other TE.    Emely Garcia RN  [facility] Triage  03/07/25 10:41 EST

## (undated) DEVICE — CANN O2 ETCO2 FITS ALL CONN CO2 SMPL A/ 7IN DISP LF

## (undated) DEVICE — TUBING, SUCTION, 1/4" X 10', STRAIGHT: Brand: MEDLINE

## (undated) DEVICE — TREK CORONARY DILATATION CATHETER 3.50 MM X 15 MM / RAPID-EXCHANGE: Brand: TREK

## (undated) DEVICE — CATH DIAG IMPULSE FL3.5 5F 100CM

## (undated) DEVICE — GLIDESHEATH BASIC HYDROPHILIC COATED INTRODUCER SHEATH: Brand: GLIDESHEATH

## (undated) DEVICE — SENSR O2 OXIMAX FNGR A/ 18IN NONSTR

## (undated) DEVICE — KT MANIFLD CARDIAC

## (undated) DEVICE — CATH DIAG IMPULSE PIG 5F 100CM

## (undated) DEVICE — NC TREK CORONARY DILATATION CATHETER 4.0 MM X 15 MM / RAPID-EXCHANGE: Brand: NC TREK

## (undated) DEVICE — GW EMR FIX EXCHG J STD .035 3MM 260CM

## (undated) DEVICE — RUNTHROUGH NS EXTRA FLOPPY PTCA GUIDEWIRE: Brand: RUNTHROUGH

## (undated) DEVICE — SINGLE-USE BIOPSY FORCEPS: Brand: RADIAL JAW 4

## (undated) DEVICE — ADAPT CLN BIOGUARD AIR/H2O DISP

## (undated) DEVICE — SNAR POLYP CAPTIVATOR RND STFF 2.4 240CM 10MM 1P/U

## (undated) DEVICE — KT ORCA ORCAPOD DISP STRL

## (undated) DEVICE — DEV INDEFLATOR P/N 580289

## (undated) DEVICE — 6F .070 JR 4 100CM: Brand: CORDIS

## (undated) DEVICE — LN SMPL CO2 SHTRM SD STREAM W/M LUER

## (undated) DEVICE — PK CATH CARD 40

## (undated) DEVICE — BND PRESS RADL COMFRT 14IN STRL